# Patient Record
Sex: FEMALE | Race: WHITE | NOT HISPANIC OR LATINO | Employment: OTHER | URBAN - METROPOLITAN AREA
[De-identification: names, ages, dates, MRNs, and addresses within clinical notes are randomized per-mention and may not be internally consistent; named-entity substitution may affect disease eponyms.]

---

## 2017-02-21 ENCOUNTER — APPOINTMENT (OUTPATIENT)
Dept: GENERAL RADIOLOGY | Facility: HOSPITAL | Age: 72
End: 2017-02-21

## 2017-02-21 PROCEDURE — 73070 X-RAY EXAM OF ELBOW: CPT | Performed by: FAMILY MEDICINE

## 2017-06-25 ENCOUNTER — TELEPHONE (OUTPATIENT)
Dept: URGENT CARE | Facility: CLINIC | Age: 72
End: 2017-06-25

## 2018-06-15 ENCOUNTER — PREP FOR SURGERY (OUTPATIENT)
Dept: OTHER | Facility: HOSPITAL | Age: 73
End: 2018-06-15

## 2018-06-15 DIAGNOSIS — Z86.010 HISTORY OF ADENOMATOUS POLYP OF COLON: Primary | ICD-10-CM

## 2018-07-02 PROBLEM — Z86.010 HISTORY OF ADENOMATOUS POLYP OF COLON: Status: ACTIVE | Noted: 2018-07-02

## 2018-07-09 ENCOUNTER — TELEPHONE (OUTPATIENT)
Dept: GASTROENTEROLOGY | Facility: CLINIC | Age: 73
End: 2018-07-09

## 2018-07-09 NOTE — TELEPHONE ENCOUNTER
"Call to pt.  Reports 1 1/2 weeks ago had hard stool causing internal hemorrhoids to bleed.  Began sitz baths and fell in tub - will see ortho today.  States has limited diet to toast, applesauce, chicken broth because after eating, experiences \"hyperperistalsis\", gas, cramping, nausea.   Continues to have hard stools.  States stool softeners not helpful.      Pt tearful - asking how to manage symptoms until c/s scheduled for 8/22.  Question to Dr Becerra.    Also requesting change bowel prep to Miralax prep - home address verified and info mailed.    "

## 2018-07-09 NOTE — TELEPHONE ENCOUNTER
----- Message from Inez Botello sent at 7/9/2018  8:29 AM EDT -----  Regarding: NOT FEELING WELL   Contact: [phone]  PT CALLED COMPLAIN OF CHANGES IN BOWELS AND NOT ABLE TO EAT..

## 2018-07-10 RX ORDER — LUBIPROSTONE 24 UG/1
24 CAPSULE ORAL 2 TIMES DAILY WITH MEALS
Qty: 60 CAPSULE | Refills: 3 | Status: SHIPPED | OUTPATIENT
Start: 2018-07-10 | End: 2018-07-20 | Stop reason: DRUGHIGH

## 2018-07-10 NOTE — TELEPHONE ENCOUNTER
I have sent Adriana to her pharmacy for her to try.  Take once a day with food and increase to twice daily if needed.  Recommend a prep other than the MiraLAX prep due to her history of chronic constipation if she feels like she can tolerate it.

## 2018-07-13 ENCOUNTER — TELEPHONE (OUTPATIENT)
Dept: GASTROENTEROLOGY | Facility: CLINIC | Age: 73
End: 2018-07-13

## 2018-07-13 NOTE — TELEPHONE ENCOUNTER
Call to pt.  hospitals has been taking Amitiza once daily with good results - asking if ok to continue.  Adise per Dr Becerra instructions of 7/10 to take once daily with good and increase to twice daily IF NEEDED.  Pt verb understanding.    hospitals has changed eating habits - cut out all junk food.  Eating fresh fruit, whole grain bread.    hospitals does not want to take Suprep because concerned about kidney function. hospitals plans to take Miralax prep, because now having good results with Amitiza.  Udpate to Dr Becerra.

## 2018-07-13 NOTE — TELEPHONE ENCOUNTER
----- Message from Yahaira Hall sent at 7/13/2018 10:30 AM EDT -----  Regarding: PT CALLED ABOUT MEDICATION  (AMITIZA)  Contact: [phone]  ..

## 2018-07-20 ENCOUNTER — TELEPHONE (OUTPATIENT)
Dept: GASTROENTEROLOGY | Facility: CLINIC | Age: 73
End: 2018-07-20

## 2018-07-20 RX ORDER — LUBIPROSTONE 8 UG/1
8 CAPSULE ORAL 2 TIMES DAILY WITH MEALS
Qty: 60 CAPSULE | Refills: 5 | Status: SHIPPED | OUTPATIENT
Start: 2018-07-20 | End: 2022-10-04

## 2018-07-20 NOTE — TELEPHONE ENCOUNTER
Called pt and pt reports that she takes her amitiza 24mcg around 11pm.  She states she wakes up at 5am and has at least 4 watery stools per day.  Pt is asking if she can have the dose lowered to 8mcg.  Pt states that way if she needs to take 2 she can.  Advised pt would send message to Dr Becerra. Pt verb understanding.

## 2018-07-20 NOTE — TELEPHONE ENCOUNTER
Called pt and advised per Dr Becerra that she has called in Amitiza 8mcg one tab bid to her [facility]. Pt verb understanding.

## 2018-07-20 NOTE — TELEPHONE ENCOUNTER
----- Message from Winnie Pittman sent at 7/20/2018  9:04 AM EDT -----  Regarding: PT CALLED - AMITIZA CAUSING DIARRHEA  Contact: [phone]  PT ASK IF DOSAGE COULD BE LOWERED. SHE ASK FOR 8MCG ON THE AMITIZA IF POSSIBLE.

## 2018-08-21 ENCOUNTER — TELEPHONE (OUTPATIENT)
Dept: GASTROENTEROLOGY | Facility: CLINIC | Age: 73
End: 2018-08-21

## 2018-08-21 NOTE — TELEPHONE ENCOUNTER
Call to pt.  States has questions about med record on "[employer]t.  Advise to reconcile meds with each o/v.  Verb understanding.

## 2018-08-21 NOTE — TELEPHONE ENCOUNTER
----- Message from Tiffanie Green sent at 8/21/2018  8:59 AM EDT -----  Regarding: [facility]  Contact: [phone]  Please call pt regarding medications that are listed in her MyChart. The pt said she called the MyChart people and they said that one of the nurses from our office would have to fix it. Thx

## 2018-08-22 ENCOUNTER — HOSPITAL ENCOUNTER (OUTPATIENT)
Facility: HOSPITAL | Age: 73
Setting detail: HOSPITAL OUTPATIENT SURGERY
Discharge: HOME | End: 2018-08-22
Attending: INTERNAL MEDICINE | Admitting: INTERNAL MEDICINE

## 2018-08-22 ENCOUNTER — ANESTHESIA EVENT (OUTPATIENT)
Dept: GASTROENTEROLOGY | Facility: HOSPITAL | Age: 73
End: 2018-08-22

## 2018-08-22 ENCOUNTER — ANESTHESIA (OUTPATIENT)
Dept: GASTROENTEROLOGY | Facility: HOSPITAL | Age: 73
End: 2018-08-22

## 2018-08-22 VITALS
DIASTOLIC BLOOD PRESSURE: 74 MMHG | OXYGEN SATURATION: 100 % | RESPIRATION RATE: 18 BRPM | WEIGHT: 167.25 LBS | SYSTOLIC BLOOD PRESSURE: 112 MMHG | HEIGHT: 67 IN | BODY MASS INDEX: 26.25 KG/M2 | TEMPERATURE: 98 F | HEART RATE: 55 BPM

## 2018-08-22 DIAGNOSIS — Z86.010 HISTORY OF ADENOMATOUS POLYP OF COLON: ICD-10-CM

## 2018-08-22 PROCEDURE — S0260 H&P FOR SURGERY: HCPCS | Performed by: INTERNAL MEDICINE

## 2018-08-22 PROCEDURE — 45381 COLONOSCOPY SUBMUCOUS NJX: CPT | Performed by: INTERNAL MEDICINE

## 2018-08-22 PROCEDURE — 25010000002 PROPOFOL 10 MG/ML EMULSION: Performed by: ANESTHESIOLOGY

## 2018-08-22 PROCEDURE — 45385 COLONOSCOPY W/LESION REMOVAL: CPT | Performed by: INTERNAL MEDICINE

## 2018-08-22 PROCEDURE — 88305 TISSUE EXAM BY PATHOLOGIST: CPT | Performed by: INTERNAL MEDICINE

## 2018-08-22 DEVICE — DEV CLIP ENDO RESOLUTION360 CONTRL ROT 235CM: Type: IMPLANTABLE DEVICE | Site: TRANSVERSE COLON | Status: FUNCTIONAL

## 2018-08-22 RX ORDER — LOSARTAN POTASSIUM 50 MG/1
50 TABLET ORAL DAILY
COMMUNITY
End: 2022-10-04

## 2018-08-22 RX ORDER — PROPOFOL 10 MG/ML
VIAL (ML) INTRAVENOUS CONTINUOUS PRN
Status: DISCONTINUED | OUTPATIENT
Start: 2018-08-22 | End: 2018-08-22 | Stop reason: SURG

## 2018-08-22 RX ORDER — PROPOFOL 10 MG/ML
VIAL (ML) INTRAVENOUS AS NEEDED
Status: DISCONTINUED | OUTPATIENT
Start: 2018-08-22 | End: 2018-08-22 | Stop reason: SURG

## 2018-08-22 RX ORDER — SODIUM CHLORIDE, SODIUM LACTATE, POTASSIUM CHLORIDE, CALCIUM CHLORIDE 600; 310; 30; 20 MG/100ML; MG/100ML; MG/100ML; MG/100ML
30 INJECTION, SOLUTION INTRAVENOUS CONTINUOUS PRN
Status: DISCONTINUED | OUTPATIENT
Start: 2018-08-22 | End: 2018-08-22 | Stop reason: HOSPADM

## 2018-08-22 RX ORDER — LIDOCAINE HYDROCHLORIDE 20 MG/ML
INJECTION, SOLUTION INFILTRATION; PERINEURAL AS NEEDED
Status: DISCONTINUED | OUTPATIENT
Start: 2018-08-22 | End: 2018-08-22 | Stop reason: SURG

## 2018-08-22 RX ADMIN — PROPOFOL 50 MG: 10 INJECTION, EMULSION INTRAVENOUS at 11:00

## 2018-08-22 RX ADMIN — SODIUM CHLORIDE, POTASSIUM CHLORIDE, SODIUM LACTATE AND CALCIUM CHLORIDE: 600; 310; 30; 20 INJECTION, SOLUTION INTRAVENOUS at 10:57

## 2018-08-22 RX ADMIN — SODIUM CHLORIDE, POTASSIUM CHLORIDE, SODIUM LACTATE AND CALCIUM CHLORIDE 30 ML/HR: 600; 310; 30; 20 INJECTION, SOLUTION INTRAVENOUS at 10:48

## 2018-08-22 RX ADMIN — PROPOFOL 140 MCG/KG/MIN: 10 INJECTION, EMULSION INTRAVENOUS at 11:03

## 2018-08-22 RX ADMIN — LIDOCAINE HYDROCHLORIDE 30 MG: 20 INJECTION, SOLUTION INFILTRATION; PERINEURAL at 10:59

## 2018-08-22 NOTE — ANESTHESIA POSTPROCEDURE EVALUATION
"Patient: Debo Blair    Procedure Summary     Date:  08/22/18 Room / Location:   [facility]    Anesthesia Start:  1057 Anesthesia Stop:  1149    Procedure:  COLONOSCOPY TO CECUM AND INTO TERMINAL ILEUM WITH HOT SNARE POLYPECTOMY,  5 ML SALINE LIFT INJECTION, 1 ML TATTOO INK INJECTION, RESOLUTION CLIP X1 PLACEMENT, AND APC CAUTERY TO TRANSVERSE COLON POLYP SITE (N/A ) Diagnosis:       History of adenomatous polyp of colon      (History of adenomatous polyp of colon [Z86.010])    Surgeon:  Luna Becerra MD Provider:  Victor Manuel Can MD    Anesthesia Type:  MAC ASA Status:  3          Anesthesia Type: MAC  Last vitals  BP   (!) 88/58 (08/22/18 1148)   Temp   36.7 °C (98 °F) (08/22/18 1148)   Pulse   65 (08/22/18 1148)   Resp   18 (08/22/18 1148)     SpO2   99 % (08/22/18 1148)     Post Anesthesia Care and Evaluation    Patient location during evaluation: bedside  Patient participation: complete - patient participated  Level of consciousness: awake  Pain score: 2  Pain management: adequate  Airway patency: patent  Anesthetic complications: No anesthetic complications  PONV Status: none  Cardiovascular status: acceptable  Respiratory status: acceptable  Hydration status: acceptable    Comments: BP (!) 88/58 (BP Location: Left arm, Patient Position: Lying)   Pulse 65   Temp 36.7 °C (98 °F) (Oral)   Resp 18   Ht 170.2 cm (67\")   Wt 75.9 kg (167 lb 4 oz)   LMP  (LMP Unknown)   SpO2 99%   BMI 26.20 kg/m²         "

## 2018-08-22 NOTE — ANESTHESIA PREPROCEDURE EVALUATION
Anesthesia Evaluation     Patient summary reviewed and Nursing notes reviewed   NPO Solid Status: > 8 hours  NPO Liquid Status: > 8 hours           Airway   Mallampati: II  TM distance: >3 FB  Neck ROM: full  Dental - normal exam     Pulmonary - negative pulmonary ROS and normal exam   Cardiovascular - normal exam    (+) hypertension,       Neuro/Psych  (+) psychiatric history Anxiety,     GI/Hepatic/Renal/Endo    (+)  GERD,      Musculoskeletal     Abdominal    Substance History      OB/GYN          Other   (+) arthritis   history of cancer                    Anesthesia Plan    ASA 3     MAC     Anesthetic plan and risks discussed with patient.

## 2018-08-22 NOTE — DISCHARGE INSTRUCTIONS
For the next 24 hours patient needs to be with a responsible adult.    For 24 hours DO NOT drive, operate machinery, appliances, drink alcohol, make important decisions or sign legal documents.    Start with a light or bland diet and advance to regular diet as tolerated.    Follow recommendations on procedure report provided by your doctor.    Call Dr Becerra for problems [phone]    Problems may include but not limited to: large amounts of bleeding, trouble breathing, repeated vomiting, severe unrelieved pain, fever or chills.

## 2018-08-22 NOTE — H&P
[facility]  Pre Procedure History & Physical    Chief Complaint:   History of adenomatous polyps, history of endoscopically unresectable polyp    Subjective     HPI:   73 yo wf for colonoscopy due to History of adenomatous polyps, history of endoscopically unresectable polyp.  She underwent c/s last in 2016 - descending colon polyp could not be removed endoscopically and she subsequently underwent colon resection in 2016 - path TA with LGD.    Past Medical History:   Past Medical History:   Diagnosis Date   • Anxiety    • Arthritis    • Diverticulosis    • Fibrocystic breast changes 1971   • GERD (gastroesophageal reflux disease)    • History of colon polyps    • Hypertension    • IBS (irritable bowel syndrome)    • Melanosis coli    • Osteoarthritis    • Osteoporosis    • Paralytic ileus (CMS/HCC) 1975       Past Surgical History:  Past Surgical History:   Procedure Laterality Date   • ANAL FISSURECTOMY  2004   • CHOLECYSTECTOMY     • COLON RESECTION N/A 3/21/2016    Procedure: COLON RESECTION LAPAROSCOPIC LEFT MINDY COLECTOMY ;  Surgeon: Nikki Smith MD;  Location: [facility];  Service:    • COLON RESECTION     • COLONOSCOPY  01/18/2016    polyps, NBIH, tubular adenoma w/low grade dysplasia   • HYSTERECTOMY     • SKIN TAG REMOVAL  2004       Family History:  Family History   Problem Relation Age of Onset   • Diabetes Mother    • Hypertension Mother    • Kidney disease Mother    • Heart disease Father    • Arthritis Father    • Coarctation of the aorta Father    • Heart valve disorder Brother    • Colon polyps Daughter    • Seizures Son        Social History:   reports that she quit smoking about 3 years ago. She started smoking about 48 years ago. She has a 40.00 pack-year smoking history. She quit smokeless tobacco use about 2 years ago. She reports that she does not drink alcohol or use drugs.    Medications:   Prescriptions Prior to Admission   Medication Sig Dispense Refill Last  Dose   • calcium carbonate (TUMS) 500 MG chewable tablet Chew 1 tablet Daily.   Past Month at Unknown time   • losartan (COZAAR) 50 MG tablet Take 50 mg by mouth Daily.   8/22/2018 at Unknown time   • metoprolol tartrate (LOPRESSOR) 25 MG tablet Take 25 mg by mouth 2 (Two) Times a Day.   8/21/2018 at Unknown time   • PROAIR  (90 BASE) MCG/ACT inhaler 2 puffs every 4 (four) hours as needed.  1 8/21/2018 at Unknown time   • Probiotic Product (PROBIOTIC DAILY PO) Take  by mouth.   Past Week at Unknown time   • simethicone (MYLICON) 125 MG chewable tablet Chew 125 mg Every 6 (Six) Hours As Needed for flatulence.   Past Month at Unknown time   • ALPRAZolam (XANAX) 1 MG tablet Take 0.05 mg by mouth at night as needed.  4 8/20/2018   • aspirin 81 MG EC tablet Take 81 mg by mouth Daily.   More than a month at Unknown time   • Codeine Polt-Chlorphen Polt ER (TUZISTRA XR) 14.7-2.8 MG/5ML Suspension Extended Release Take 10 mL by mouth 2 (Two) Times a Day. 180 mL 0    • coenzyme Q10 100 MG capsule Take 100 mg by mouth Daily.   Taking   • KRILL OIL PO Take  by mouth.   Taking   • lubiprostone (AMITIZA) 8 MCG capsule Take 1 capsule by mouth 2 (Two) Times a Day With Meals. 60 capsule 5 8/18/2018   • montelukast (SINGULAIR) 10 MG tablet   3 8/20/2018   • polyethylene glycol (MIRALAX) packet Take 17 g by mouth Daily.   Taking   • pravastatin (PRAVACHOL) 10 MG tablet Take 5 mg by mouth Daily.   Taking   • promethazine (PHENERGAN) 12.5 MG tablet Take 1 tablet by mouth every 6 (six) hours as needed for nausea or vomiting. 46 tablet 0 More than a month at Unknown time   • SUMAtriptan (IMITREX) 100 MG tablet Take one tablet at onset of headache. May repeat dose one time in 2 hours if headache not relieved.  4 More than a month at Unknown time   • valsartan (DIOVAN) 80 MG tablet Take 160 mg by mouth every morning. LIKES TO TAKE TWO 80 MG TABS  3 Taking   • vitamin D (ERGOCALCIFEROL) 97276 UNITS capsule capsule 50,000 Units every 7  "days.  3 8/18/2018       Allergies:  Latex; Neosporin [neomycin-bacitracin zn-polymyx]; Sulfa antibiotics; Meclizine; and Codeine    ROS:    Pertinent items are noted in HPI, all other systems reviewed and negative     Objective     Blood pressure 115/70, pulse 57, temperature 98.1 °F (36.7 °C), temperature source Oral, resp. rate 16, height 170.2 cm (67\"), weight 75.9 kg (167 lb 4 oz), SpO2 96 %, not currently breastfeeding.    Physical Exam   Constitutional: Pt is oriented to person, place, and time and well-developed, well-nourished, and in no distress.   Mouth/Throat: Oropharynx is clear and moist.   Neck: Normal range of motion.   Cardiovascular: Normal rate, regular rhythm     Pulmonary/Chest: Effort normal   Abdominal: Soft. Nontender  Skin: Skin is warm and dry.   Psychiatric: Mood, memory, affect and judgment normal.     Assessment/Plan     Diagnosis:  History of adenomatous polyps, history of endoscopically unresectable polyp    Anticipated Surgical Procedure:  colonoscopy    The risks, benefits, and alternatives of this procedure have been discussed with the patient or the responsible party- the patient understands and agrees to proceed.                                                            "

## 2018-08-23 ENCOUNTER — TELEPHONE (OUTPATIENT)
Dept: GASTROENTEROLOGY | Facility: CLINIC | Age: 73
End: 2018-08-23

## 2018-08-23 LAB
CYTO UR: NORMAL
LAB AP CASE REPORT: NORMAL
PATH REPORT.FINAL DX SPEC: NORMAL
PATH REPORT.GROSS SPEC: NORMAL

## 2018-08-23 NOTE — TELEPHONE ENCOUNTER
Called pt and pt reports she had her c/s yesterday .  She states she was feeling well today and  reports that she lifted a trash can with waste in it today  and developed pain in the LUQ.  The pain was a 7 but after she took a simethecone the pain is now around a 5.  Pt denies a fever, chills, and nausea.  Pt is asking if this is ok.  Advised pt would send message to Dr Becerra and in the meantime if symptoms worsen seek medical attn. Pt verb understanding.

## 2018-08-23 NOTE — ADDENDUM NOTE
Addendum  created 08/22/18 2020 by Arnoldo Prado MD    Anesthesia Review and Sign - Ready for Procedure, Anesthesia Review and Sign - Signed

## 2018-08-23 NOTE — TELEPHONE ENCOUNTER
----- Message from Inez Botello sent at 8/23/2018  1:02 PM EDT -----  Regarding: SIDE PAIN   Contact: [phone]  PT CALLED STATED HAD A SCOPE YESTERDAY AND MOVE SOME OBJECT OUTSIDE. PT IS HAVING SIDE PAIN

## 2018-08-24 NOTE — TELEPHONE ENCOUNTER
Called pt and advised per Dr Becerra that as long as she continuing to improve , no further workup needed especially since the pain did not start until the day following her procedure.  .       Pt verb understanding and reports she is feeling much better today.  She denies any fever and states she has an appt with her pcp today.

## 2018-08-24 NOTE — TELEPHONE ENCOUNTER
As long as she is continuing to improve, no further workup needed especially since the pain did not start until the day following her procedure.  Pls make sure she is feeling better today and has not had a fever

## 2018-08-27 ENCOUNTER — TELEPHONE (OUTPATIENT)
Dept: GASTROENTEROLOGY | Facility: CLINIC | Age: 73
End: 2018-08-27

## 2018-08-27 NOTE — TELEPHONE ENCOUNTER
The polyp(s) biopsies showed adenomatous change. This is not cancerous but is considered potentially precancerous. Follow-up colonoscopy in 2 years is advised.

## 2018-08-28 NOTE — TELEPHONE ENCOUNTER
Call to pt.  Advise per Dr Becerra that polyp(s) bx showed adenomatous change. This is not cancerous, but is considered potentially precancerous.  F/u c/s in 2 yrs is advised.  Pt verb understanding.    C/s for 8/22/20 placed in recall.

## 2018-10-24 ENCOUNTER — TELEPHONE (OUTPATIENT)
Dept: GASTROENTEROLOGY | Facility: CLINIC | Age: 73
End: 2018-10-24

## 2018-10-24 NOTE — TELEPHONE ENCOUNTER
----- Message from Inez Botello sent at 10/24/2018  8:29 AM EDT -----  Regarding: med  Contact: [phone]  Pt called with questions about amitiza

## 2018-10-24 NOTE — TELEPHONE ENCOUNTER
Called pt and pt reports she has been taking amitiza daily(once day).  She states things has been going ok.  Pt reports she has developed diarrhea since she began taking macrobid and the amitiza. .  Pt reports that she did begin taking a probiotic yesterday.  Pt is taking macrobid for uti.  Pt is asking if she can hold her amitiza till she finished the macrobid ( 1 wk).   Advised pt to continue the probiotic and macrobid.  Also advised pt to hold amitiza while having diarrhea.  Once stool is formed can resume again.  Advised pt would send message to Dr Becerra and if any further instructions or change instructions will call her back. Pt verb understanding.

## 2019-03-27 ENCOUNTER — APPOINTMENT (OUTPATIENT)
Dept: GENERAL RADIOLOGY | Facility: HOSPITAL | Age: 74
End: 2019-03-27

## 2019-03-27 ENCOUNTER — HOSPITAL ENCOUNTER (EMERGENCY)
Facility: HOSPITAL | Age: 74
Discharge: HOME | End: 2019-03-27
Attending: EMERGENCY MEDICINE | Admitting: EMERGENCY MEDICINE

## 2019-03-27 VITALS
TEMPERATURE: 97.3 F | SYSTOLIC BLOOD PRESSURE: 150 MMHG | DIASTOLIC BLOOD PRESSURE: 94 MMHG | BODY MASS INDEX: 25.9 KG/M2 | HEIGHT: 67 IN | OXYGEN SATURATION: 97 % | WEIGHT: 165 LBS | RESPIRATION RATE: 18 BRPM | HEART RATE: 116 BPM

## 2019-03-27 DIAGNOSIS — S32.010S CLOSED COMPRESSION FRACTURE OF FIRST LUMBAR VERTEBRA, SEQUELA: Primary | ICD-10-CM

## 2019-03-27 DIAGNOSIS — S60.221A CONTUSION OF RIGHT HAND, INITIAL ENCOUNTER: ICD-10-CM

## 2019-03-27 DIAGNOSIS — S76.011A HIP STRAIN, RIGHT, INITIAL ENCOUNTER: ICD-10-CM

## 2019-03-27 DIAGNOSIS — S60.222A CONTUSION OF LEFT HAND, INITIAL ENCOUNTER: ICD-10-CM

## 2019-03-27 DIAGNOSIS — S46.912A STRAIN OF LEFT ELBOW, INITIAL ENCOUNTER: ICD-10-CM

## 2019-03-27 DIAGNOSIS — S80.11XA CONTUSION OF RIGHT LOWER LEG, INITIAL ENCOUNTER: ICD-10-CM

## 2019-03-27 PROCEDURE — 73070 X-RAY EXAM OF ELBOW: CPT

## 2019-03-27 PROCEDURE — 72110 X-RAY EXAM L-2 SPINE 4/>VWS: CPT

## 2019-03-27 PROCEDURE — 73502 X-RAY EXAM HIP UNI 2-3 VIEWS: CPT

## 2019-03-27 PROCEDURE — 99282 EMERGENCY DEPT VISIT SF MDM: CPT

## 2019-03-27 PROCEDURE — 73590 X-RAY EXAM OF LOWER LEG: CPT

## 2019-03-27 PROCEDURE — 73630 X-RAY EXAM OF FOOT: CPT

## 2019-03-27 PROCEDURE — 73130 X-RAY EXAM OF HAND: CPT

## 2021-03-03 DIAGNOSIS — Z23 IMMUNIZATION DUE: ICD-10-CM

## 2021-03-08 ENCOUNTER — TELEPHONE (OUTPATIENT)
Dept: GASTROENTEROLOGY | Facility: CLINIC | Age: 76
End: 2021-03-08

## 2021-03-08 NOTE — TELEPHONE ENCOUNTER
LAST COLONOSCOPY 8/22/18, REPORT IS IN Epic    PERSONAL HX OF COLON POLYPS     FAMILY HX OF COLON POLYPS / CA (GRANDMOTHER)    PT IS CURRENTLY TAKING BABY ASPRIN    RX:   ALPRAZolam (XANAX) 1 MG tablet  aspirin 81 MG EC tablet    Codeine Polt-Chlorphen Polt ER (TUZISTRA XR) 14.7-2.8 MG/5ML Suspension Extended Release  coenzyme Q10 100 MG capsule  ADENOVA X 2 ADAY  AZAFITE X 1 A MONTH   losartan (COZAAR) 50 MG tablet  montelukast (SINGULAIR) 10 MG tablet  polyethylene glycol (MIRALAX) packet  Probiotic Product (PROBIOTIC DAILY PO)  simethicone (MYLICON) 125 MG chewable tablet  SUMAtriptan (IMITREX) 100 MG tablet  valsartan (DIOVAN) 80 MG tablet  vitamin D (ERGOCALCIFEROL) 57660 UNITS capsule capsule       OA FORM HAS BEEN SCANNED INTO Epic

## 2021-03-08 NOTE — TELEPHONE ENCOUNTER
----- Message from Brenna Tolbert Rep sent at 3/5/2021  9:38 AM EST -----  Regarding: Open Access  Contact: [phone]  Pt is wanting to have procedure

## 2021-03-19 ENCOUNTER — PREP FOR SURGERY (OUTPATIENT)
Dept: OTHER | Facility: HOSPITAL | Age: 76
End: 2021-03-19

## 2021-03-19 DIAGNOSIS — D12.6 ADENOMATOUS POLYP OF COLON, UNSPECIFIED PART OF COLON: Primary | ICD-10-CM

## 2021-03-31 PROBLEM — D12.6 ADENOMATOUS POLYP OF COLON: Status: ACTIVE | Noted: 2021-03-31

## 2021-05-14 ENCOUNTER — TELEPHONE (OUTPATIENT)
Dept: GASTROENTEROLOGY | Facility: CLINIC | Age: 76
End: 2021-05-14

## 2021-05-14 NOTE — TELEPHONE ENCOUNTER
Called pt and pt reports that yesterday she was diagnosed with cellulitis in 2 toes.  Her MD is going to order Keflex but it has not been sent to her pharmacy yet.  Pt is asking is it still ok to have c/s on 06/02 or does she need to postpone it.  Advised will send message to Dr Becerra.     Pt advised if she does not answer phone, it is ok to leave info on vm.

## 2021-05-14 NOTE — TELEPHONE ENCOUNTER
----- Message from Brenna Amezcua sent at 5/13/2021  4:11 PM EDT -----  Regarding: colonoscopy question  Contact: [phone]  Pt has 2 infected toes with cellulitis. Please call pt to advise if it would be okay to continue with scheduled colonoscopy 6/2/21 or should it be reschedule.

## 2021-05-27 ENCOUNTER — TELEPHONE (OUTPATIENT)
Dept: GASTROENTEROLOGY | Facility: CLINIC | Age: 76
End: 2021-05-27

## 2021-05-27 NOTE — TELEPHONE ENCOUNTER
----- Message from rBenna Tolbert Rep sent at 5/27/2021  2:09 PM EDT -----  Regarding: Question  Contact: [phone]  Pt has some questions she would like to ask

## 2021-05-27 NOTE — TELEPHONE ENCOUNTER
Call to pt.  States saw foot doctor today and was cleared for c/s on 6/2.     Update to DR Becerra.

## 2021-06-02 ENCOUNTER — ANESTHESIA EVENT (OUTPATIENT)
Dept: GASTROENTEROLOGY | Facility: HOSPITAL | Age: 76
End: 2021-06-02

## 2021-06-02 ENCOUNTER — HOSPITAL ENCOUNTER (OUTPATIENT)
Facility: HOSPITAL | Age: 76
Setting detail: HOSPITAL OUTPATIENT SURGERY
Discharge: HOME | End: 2021-06-02
Attending: INTERNAL MEDICINE | Admitting: INTERNAL MEDICINE

## 2021-06-02 ENCOUNTER — ANESTHESIA (OUTPATIENT)
Dept: GASTROENTEROLOGY | Facility: HOSPITAL | Age: 76
End: 2021-06-02

## 2021-06-02 VITALS
HEART RATE: 80 BPM | BODY MASS INDEX: 26.68 KG/M2 | HEIGHT: 67 IN | RESPIRATION RATE: 16 BRPM | SYSTOLIC BLOOD PRESSURE: 126 MMHG | DIASTOLIC BLOOD PRESSURE: 76 MMHG | OXYGEN SATURATION: 95 % | WEIGHT: 170 LBS

## 2021-06-02 DIAGNOSIS — D12.6 ADENOMATOUS POLYP OF COLON, UNSPECIFIED PART OF COLON: ICD-10-CM

## 2021-06-02 PROCEDURE — 45385 COLONOSCOPY W/LESION REMOVAL: CPT | Performed by: INTERNAL MEDICINE

## 2021-06-02 PROCEDURE — S0260 H&P FOR SURGERY: HCPCS | Performed by: INTERNAL MEDICINE

## 2021-06-02 PROCEDURE — 45380 COLONOSCOPY AND BIOPSY: CPT | Performed by: INTERNAL MEDICINE

## 2021-06-02 PROCEDURE — 88305 TISSUE EXAM BY PATHOLOGIST: CPT | Performed by: INTERNAL MEDICINE

## 2021-06-02 PROCEDURE — 25010000002 PROPOFOL 10 MG/ML EMULSION: Performed by: ANESTHESIOLOGY

## 2021-06-02 RX ORDER — PROPOFOL 10 MG/ML
VIAL (ML) INTRAVENOUS AS NEEDED
Status: DISCONTINUED | OUTPATIENT
Start: 2021-06-02 | End: 2021-06-02 | Stop reason: SURG

## 2021-06-02 RX ORDER — SODIUM CHLORIDE, SODIUM LACTATE, POTASSIUM CHLORIDE, CALCIUM CHLORIDE 600; 310; 30; 20 MG/100ML; MG/100ML; MG/100ML; MG/100ML
30 INJECTION, SOLUTION INTRAVENOUS CONTINUOUS PRN
Status: DISCONTINUED | OUTPATIENT
Start: 2021-06-02 | End: 2021-06-02 | Stop reason: HOSPADM

## 2021-06-02 RX ORDER — LIDOCAINE HYDROCHLORIDE 20 MG/ML
INJECTION, SOLUTION INFILTRATION; PERINEURAL AS NEEDED
Status: DISCONTINUED | OUTPATIENT
Start: 2021-06-02 | End: 2021-06-02 | Stop reason: SURG

## 2021-06-02 RX ORDER — GLYCOPYRROLATE 0.2 MG/ML
INJECTION INTRAMUSCULAR; INTRAVENOUS AS NEEDED
Status: DISCONTINUED | OUTPATIENT
Start: 2021-06-02 | End: 2021-06-02 | Stop reason: SURG

## 2021-06-02 RX ADMIN — LIDOCAINE HYDROCHLORIDE 40 MG: 20 INJECTION, SOLUTION INFILTRATION; PERINEURAL at 10:07

## 2021-06-02 RX ADMIN — SODIUM CHLORIDE, POTASSIUM CHLORIDE, SODIUM LACTATE AND CALCIUM CHLORIDE 30 ML/HR: 600; 310; 30; 20 INJECTION, SOLUTION INTRAVENOUS at 09:57

## 2021-06-02 RX ADMIN — GLYCOPYRROLATE 0.2 MG: 0.2 INJECTION INTRAMUSCULAR; INTRAVENOUS at 10:07

## 2021-06-02 RX ADMIN — PROPOFOL 150 MG: 10 INJECTION, EMULSION INTRAVENOUS at 10:07

## 2021-06-02 RX ADMIN — PROPOFOL 160 MCG/KG/MIN: 10 INJECTION, EMULSION INTRAVENOUS at 10:07

## 2021-06-02 NOTE — DISCHARGE INSTRUCTIONS

## 2021-06-02 NOTE — ANESTHESIA PREPROCEDURE EVALUATION
Anesthesia Evaluation     Patient summary reviewed and Nursing notes reviewed   no history of anesthetic complications:  NPO Solid Status: > 8 hours  NPO Liquid Status: > 2 hours           Airway   Mallampati: II  TM distance: >3 FB  Neck ROM: full  no difficulty expected  Dental - normal exam     Pulmonary - normal exam   (+) a smoker Current Smoked day of surgery,   (-) COPD, asthma, lung cancer  Cardiovascular - normal exam  Exercise tolerance: good (4-7 METS)    Patient on routine beta blocker and Beta blocker given within 24 hours of surgery  Rhythm: regular  Rate: normal    (+) hypertension well controlled 2 medications or greater,   (-) valvular problems/murmurs, past MI, CAD, dysrhythmias, angina, CHF, cardiac stents, CABG, pericardial effusion      Neuro/Psych  (+) headaches, psychiatric history Anxiety,     (-) seizures, TIA, CVA  GI/Hepatic/Renal/Endo    (+)  GERD well controlled,    (-) hiatal hernia, PUD, hepatitis, liver disease, no renal disease, diabetes, GI bleed, no thyroid disorder    Musculoskeletal     Abdominal  - normal exam   Substance History - negative use     OB/GYN negative ob/gyn ROS         Other   arthritis,          Phys Exam Other: Caps and veneers               Anesthesia Plan    ASA 2     MAC     intravenous induction     Anesthetic plan, all risks, benefits, and alternatives have been provided, discussed and informed consent has been obtained with: patient.

## 2021-06-02 NOTE — H&P
[facility]  Pre Procedure History & Physical    Chief Complaint:   History of adenomatous polyps    Subjective     HPI:   74yo here today for colonoscopy.  Pt reports family history of polyps in her daughter.   Last exam 2018 with piecemeal resection of a large sessile adenoma in the transverse colon with APC of residual tissue.  Lesion was tattooed.  Path reflected tubular adenoma with low-grade dysplasia.    Past Medical History:   Past Medical History:   Diagnosis Date   • Anxiety    • Arthritis    • Diverticulosis    • Fibrocystic breast changes 1971   • GERD (gastroesophageal reflux disease)    • History of colon polyps    • Hypertension    • IBS (irritable bowel syndrome)    • Melanosis coli    • Osteoarthritis    • Osteoporosis    • Paralytic ileus (CMS/HCC) 1975       Past Surgical History:  Past Surgical History:   Procedure Laterality Date   • ANAL FISSURECTOMY  2004   • CHOLECYSTECTOMY     • COLON RESECTION N/A 3/21/2016    Procedure: COLON RESECTION LAPAROSCOPIC LEFT MINDY COLECTOMY ;  Surgeon: Nikki Smith MD;  Location: [facility];  Service:    • COLON RESECTION     • COLONOSCOPY  01/18/2016    polyps, NBIH, tubular adenoma w/low grade dysplasia   • COLONOSCOPY N/A 8/22/2018    Procedure: COLONOSCOPY TO CECUM AND INTO TERMINAL ILEUM WITH HOT SNARE POLYPECTOMY,  5 ML SALINE LIFT INJECTION, 1 ML TATTOO INK INJECTION, RESOLUTION CLIP X1 PLACEMENT, AND APC CAUTERY TO TRANSVERSE COLON POLYP SITE;  Surgeon: Luna Becerra MD;  Location: [facility] ENDOSCOPY;  Service: Gastroenterology   • HYSTERECTOMY     • SKIN TAG REMOVAL  2004       Family History:  Family History   Problem Relation Age of Onset   • Diabetes Mother    • Hypertension Mother    • Kidney disease Mother    • Heart disease Father    • Arthritis Father    • Coarctation of the aorta Father    • Heart valve disorder Brother    • Colon polyps Daughter    • Seizures Son        Social History:   reports that she quit  smoking about 6 years ago. She started smoking about 51 years ago. She has a 40.00 pack-year smoking history. She quit smokeless tobacco use about 5 years ago. She reports that she does not drink alcohol and does not use drugs.    Medications:   Medications Prior to Admission   Medication Sig Dispense Refill Last Dose   • ALPRAZolam (XANAX) 1 MG tablet Take 0.05 mg by mouth at night as needed.  4    • aspirin 81 MG EC tablet Take 81 mg by mouth Daily.      • calcium carbonate (TUMS) 500 MG chewable tablet Chew 1 tablet Daily.      • Codeine Polt-Chlorphen Polt ER (TUZISTRA XR) 14.7-2.8 MG/5ML Suspension Extended Release Take 10 mL by mouth 2 (Two) Times a Day. 180 mL 0    • coenzyme Q10 100 MG capsule Take 100 mg by mouth Daily.      • KRILL OIL PO Take  by mouth.      • losartan (COZAAR) 50 MG tablet Take 50 mg by mouth Daily.      • lubiprostone (AMITIZA) 8 MCG capsule Take 1 capsule by mouth 2 (Two) Times a Day With Meals. 60 capsule 5    • metoprolol tartrate (LOPRESSOR) 25 MG tablet Take 25 mg by mouth 2 (Two) Times a Day.      • montelukast (SINGULAIR) 10 MG tablet   3    • polyethylene glycol (MIRALAX) packet Take 17 g by mouth Daily.      • pravastatin (PRAVACHOL) 10 MG tablet Take 5 mg by mouth Daily.      • PROAIR  (90 BASE) MCG/ACT inhaler 2 puffs every 4 (four) hours as needed.  1    • Probiotic Product (PROBIOTIC DAILY PO) Take  by mouth.      • promethazine (PHENERGAN) 12.5 MG tablet Take 1 tablet by mouth every 6 (six) hours as needed for nausea or vomiting. 46 tablet 0    • simethicone (MYLICON) 125 MG chewable tablet Chew 125 mg Every 6 (Six) Hours As Needed for flatulence.      • SUMAtriptan (IMITREX) 100 MG tablet Take one tablet at onset of headache. May repeat dose one time in 2 hours if headache not relieved.  4    • valsartan (DIOVAN) 80 MG tablet Take 160 mg by mouth every morning. LIKES TO TAKE TWO 80 MG TABS  3    • vitamin D (ERGOCALCIFEROL) 63897 UNITS capsule capsule 50,000 Units  "every 7 days.  3        Allergies:  Latex, Neosporin [neomycin-bacitracin zn-polymyx], Sulfa antibiotics, Meclizine, Codeine, and Keflex [cephalexin]    ROS:    Pertinent items are noted in HPI, all other systems reviewed and negative     Objective     Blood pressure 142/87, pulse 95, resp. rate 13, height 170.2 cm (67\"), weight 77.1 kg (170 lb), SpO2 98 %, not currently breastfeeding.    Physical Exam   Constitutional: Pt is oriented to person, place, and time and well-developed, well-nourished, and in no distress.   Mouth/Throat: Oropharynx is clear and moist.   Neck: Normal range of motion.   Cardiovascular: Normal rate, regular rhythm    Pulmonary/Chest: Effort normal    Abdominal: Soft. Nontender  Skin: Skin is warm and dry.   Psychiatric: Mood, memory, affect and judgment normal.     Assessment/Plan     Diagnosis:  history of adenomatous polyps    Anticipated Surgical Procedure:  colonoscopy    The risks, benefits, and alternatives of this procedure have been discussed with the patient or the responsible party- the patient understands and agrees to proceed.                                                              "

## 2021-06-02 NOTE — ANESTHESIA POSTPROCEDURE EVALUATION
Patient: Debo Blair    Procedure Summary     Date: 06/02/21 Room / Location:  [facility] /  [facility]    Anesthesia Start: 0959 Anesthesia Stop: 1041    Procedure: COLONOSCOPY to anastamosis and cecum with cold/hot snare polypectomies (N/A ) Diagnosis:       Adenomatous polyp of colon, unspecified part of colon      (Adenomatous polyp of colon, unspecified part of colon [D12.6])    Surgeons: Luna Becerra MD Provider: Demetris Dominguez MD    Anesthesia Type: MAC ASA Status: 2          Anesthesia Type: MAC    Vitals  Vitals Value Taken Time   BP 91/57 06/02/21 1037   Temp     Pulse 88 06/02/21 1037   Resp 16 06/02/21 1037   SpO2 97 % 06/02/21 1037           Post Anesthesia Care and Evaluation    Patient location during evaluation: bedside  Patient participation: complete - patient participated  Level of consciousness: responsive to light touch, responsive to verbal stimuli and sleepy but conscious  Pain score: 0  Pain management: adequate  Airway patency: patent  Anesthetic complications: No anesthetic complications  PONV Status: none  Cardiovascular status: acceptable and hemodynamically stable  Respiratory status: acceptable  Hydration status: acceptable

## 2021-06-03 LAB
LAB AP CASE REPORT: NORMAL
PATH REPORT.FINAL DX SPEC: NORMAL
PATH REPORT.GROSS SPEC: NORMAL

## 2021-06-04 NOTE — PROGRESS NOTES
2 of The polyp(s) biopsies showed adenomatous change. This is not cancerous but is considered potentially precancerous. Follow-up colonoscopy in 5 years is advised.

## 2021-06-11 ENCOUNTER — TELEPHONE (OUTPATIENT)
Dept: GASTROENTEROLOGY | Facility: CLINIC | Age: 76
End: 2021-06-11

## 2021-06-11 NOTE — TELEPHONE ENCOUNTER
----- Message from Brenna Tolbert sent at 6/11/2021 12:50 PM EDT -----  Regarding: Results  Contact: [phone]  Pt is calling for her results

## 2021-06-11 NOTE — TELEPHONE ENCOUNTER
----- Message from Luna Becerra MD sent at 6/4/2021  8:48 AM EDT -----  2 of The polyp(s) biopsies showed adenomatous change. This is not cancerous but is considered potentially precancerous. Follow-up colonoscopy in 5 years is advised.

## 2021-06-11 NOTE — TELEPHONE ENCOUNTER
"Call to pt.  Advise per Dr Becerra note.  Verb understanding.     Asks to double check 5 yr recall \"with my history\".  Grandmother and aunt had polyps \"that they didn't catch in time.   Advise that f/u based on professional society recommendations.  Asks to double check.     Message to Dr Becerra.     C/s for 6/2/26 placed in recall and HM.   "

## 2022-03-02 ENCOUNTER — TREATMENT (OUTPATIENT)
Dept: PHYSICAL THERAPY | Facility: CLINIC | Age: 77
End: 2022-03-02

## 2022-03-02 DIAGNOSIS — M54.50 LUMBAR PAIN WITH RADIATION DOWN LEFT LEG: Primary | ICD-10-CM

## 2022-03-02 DIAGNOSIS — M79.605 LUMBAR PAIN WITH RADIATION DOWN LEFT LEG: Primary | ICD-10-CM

## 2022-03-02 DIAGNOSIS — M25.552 LEFT HIP PAIN: ICD-10-CM

## 2022-03-02 PROCEDURE — 97110 THERAPEUTIC EXERCISES: CPT | Performed by: PHYSICAL THERAPIST

## 2022-03-02 PROCEDURE — 97162 PT EVAL MOD COMPLEX 30 MIN: CPT | Performed by: PHYSICAL THERAPIST

## 2022-03-02 NOTE — PROGRESS NOTES
Physical Therapy Initial Evaluation and Plan of Care      Patient: Debo Blair   : 1945  Diagnosis/ICD-10 Code:  Lumbar pain with radiation down left leg [M54.50, M79.605]  Referring practitioner: Demetris Roper MD  Date of Initial Visit: 3/2/2022  Today's Date: 3/2/2022  Patient seen for 1 sessions           Subjective Evaluation    History of Present Illness  Onset date: 2021.  Mechanism of injury: She stepped on hornet nest last July and was bit several times.  She fell and landed on her right leg trying to avoid landing on her left hip. She had a compressed fracture in the upper back.  She finished series of 3 lumbar epidural last Thursday.   She has trouble getting in and out of car that is a Honda Civic due to back and hip.  She has a swivel seat.  She wears a left hip brace, left knee wrap, back brace.  PMH:  Cervical bone spurs, labral tears in L more than R hip. The left hip feels unstable and worse if she does too much. The left hip feels like it could give out. Lumbar spinal stenosis.  She does neck exercises at home. She uses band  Her son helps with going to the store.   She will not walk to the mailbox that is 1 block away. Pain increased with vacuuming, changing sheets, making bed  She takes Flexeril as needed, Tylenol  She takes Xanax, meds for blood pressure, allergic to Gabapentin    MRI Lumbar:  Central canal narrowing L4/5 contacts R L5 nerve root, L2/3 disc bulge contacting the L3 nerve root.  Moderate left foraminal narrowing.  Cervical:  Multilevel DDD, canal narrowing greatest C4/5 and less C5/6, C6/7.        Patient Occupation: Retired nurse  Pain  Current pain ratin (Lower back tight )  At best pain ratin (In bed)  At worst pain ratin (She forgot her back brace)  Relieving factors: change in position, ice and rest  Aggravating factors: ambulation (sitting)    Social Support  Lives in: one-story house (Basement to furnace)  Lives with:  alone    Treatments  Previous treatment: physical therapy and injection treatment  Patient Goals  Patient goals for therapy: decreased pain and increased motion             Objective          Static Posture   General Observations  Scoliosis.     Thoracic Spine  Hyperkyphosis.    Lumbar Spine   Increased lordosis.     Pelvis   Pelvis (Left): Depressed.   Pelvis (Right): Elevated.     Active Range of Motion     Lumbar   Flexion: WFL  Extension: Active lumbar extension: Pulls right lumbar at full stretch.   Left lateral flexion: WFL  Right lateral flexion: WFL  Left rotation: WFL  Right rotation: WFL    Passive Range of Motion   Left Hip   Flexion: 90 degrees with pain  Internal rotation (90/90): 15 degrees with pain    Strength/Myotome Testing     Left Hip   Planes of Motion   Flexion: 3+  Abduction: 4    Right Hip   Planes of Motion   Flexion: 4-  Abduction: 4+    Left Knee   Flexion: 4+  Extension: 5    Right Knee   Flexion: 4+  Extension: 5    Left Ankle/Foot   Dorsiflexion: 5  Plantar flexion: 4    Right Ankle/Foot   Dorsiflexion: 5  Plantar flexion: 4    Additional Strength Details  Transverse abdominus 3/5  Able to bridge hips 3/5      Ambulation   Weight-Bearing Status   Assistive device used: single point cane (Back brace, left hip brace, left knee wrap)    Observational Gait   Gait: antalgic     Functional Assessment     Single Leg Stance   Left: 3 seconds        See Exercise, Manual, and Modality Logs for complete treatment.   Reviewed her written HEP that was given to her by MD.  Lumbar extension feels good.    Functional Outcome Score: Modified Oswestry score 62% disability and LEFS 31% ability that is 69% disability         Assessment & Plan     Assessment  Impairments: abnormal gait, abnormal or restricted ROM, activity intolerance, impaired balance, impaired physical strength, lacks appropriate home exercise program and pain with function  Functional Limitations: lifting, sleeping, walking,  uncomfortable because of pain, sitting, standing, stooping and unable to perform repetitive tasks  Assessment details: Debo Blair is a 76 y.o. year-old female referred to physical therapy for lower back pain with left radiculopathy due to spinal stenosis. She presents with a evolving clinical presentation because the left hip may feel unstable to her while walking and she relies on cane and braces outside the house.  She has comorbidities of left hip pain and personal factors of living alone and only goes out for necessary appointments that may affect her progress in the plan of care.  Signs and symptoms are consistent with physical therapy diagnosis of chronic lower back pain that radiates down the left leg with left hip pain that would benefit from current x-ray to assess joint integrity. She will benefit from aquatic therapy for strength and mobility.  Prognosis: good    Goals  Plan Goals: Date to achieve goals:  05/31/22    Date to achieve STGs:  04/12/22    STG 1 Patient will perform aquatic therapy exercises for lumbar and hip ROM/flexibility, strength and conditioning without increased pain.    STG 2 Patient will demonstrate good postural awareness with standing and walking unsupported in pool for 5 minutes    STG 3 Patient will report decreased pain following aquatic therapy session by 1 point on VAS    STG 4 Patient will increase B hip strength to at least 4-/5 to improve stability with walking    Date to achieve LTGs:  05/31/22    LTG 1 Patient will demonstrate an independent aquatic HEP for lumbar and hip strength,  ROM/flexibility, conditioning and balance with community resources     LTG 2 Patient will demonstrate increased core and hip strength and balance with dynamic movements     LTG 3 Patient will report decreased functional disability on Modified Oswestry score from 62 % to 52 % or less    LTG 4 Patient will report decreased incidence of left hip feels unstable by at least 25%    LTG 5  Patient will increase B hip strength to at least 4/5 to improve stability with walking    LTG 6 Patient will report decreased pain to 5/10 or less with ADLs    LTG 7 Patient will report increased ease with car transfer by 25%      Plan  Therapy options: will be seen for skilled therapy services  Other planned modality interventions: Aquatic therapy  Planned therapy interventions: abdominal trunk stabilization, balance/weight-bearing training, strengthening, postural training, neuromuscular re-education, home exercise program, therapeutic activities and stretching  Frequency: 2x week  Duration in weeks: 12  Treatment plan discussed with: patient  Plan details: Aquatic therapy for core and hip strength, careful with left hip ROM initially   She does not like to raise up on her toes due to history of stress fracture           Timed:  Manual Therapy:    0     mins  60407;  Therapeutic Exercise:    15     mins  00058;     Neuromuscular Yoon:    0    mins  69155;    Therapeutic Activity:     0     mins  69806;     Gait Trainin     mins  75322;     Ultrasound:     0     mins  56921;    Iontophoresis    0     mins 32004  Dry Needling   0     mins 82827/ 26074 (Self-pay)      Untimed:  Electrical Stimulation:    0     mins  44651 ( );  Traction:  0     mins  84053;   Low Eval     0     Mins  04283  Mod Eval     28     Mins  64789  High Eval                       0     Mins  46382    Timed Treatment:   15   mins   Total Treatment:     43   mins    PT SIGNATURE: Janell Clifford PT     License Number: [id number]    Electronically signed by Janell Clifford PT, 22, 9:31 AM EST    DATE TREATMENT INITIATED: 3/2/2022    Medicare Initial Certification  Certification Period: 2022  I certify that the therapy services are furnished while this patient is under my care.  The services outlined above are required by this patient, and will be reviewed every 90 days.     PHYSICIAN: Demetris Roper MD   NPI:  [id number]                                         DATE:     Please sign and return via fax to [phone] Thank you, [facility].

## 2022-03-04 ENCOUNTER — TREATMENT (OUTPATIENT)
Dept: PHYSICAL THERAPY | Facility: CLINIC | Age: 77
End: 2022-03-04

## 2022-03-04 DIAGNOSIS — M79.605 LUMBAR PAIN WITH RADIATION DOWN LEFT LEG: Primary | ICD-10-CM

## 2022-03-04 DIAGNOSIS — M54.50 LUMBAR PAIN WITH RADIATION DOWN LEFT LEG: Primary | ICD-10-CM

## 2022-03-04 DIAGNOSIS — M25.552 LEFT HIP PAIN: ICD-10-CM

## 2022-03-04 PROCEDURE — 97113 AQUATIC THERAPY/EXERCISES: CPT | Performed by: PHYSICAL THERAPIST

## 2022-03-04 NOTE — PROGRESS NOTES
Physical Therapy Daily Progress Note    Patient: Debo Blair   : 1945  Diagnosis/ICD-10 Code:  Lumbar pain with radiation down left leg [M54.50, M79.605]  Referring practitioner: Demetris Roper MD  Date of Initial Visit: Type: THERAPY  Noted: 3/2/2022  Today's Date: 3/4/2022  Patient seen for 2 sessions             Subjective Evaluation    History of Present Illness    Subjective comment: L leg is my problem child but it's doing pretty good this am, my R LB/post hip is what's bothering me today - pain 6/10.          Objective   .   AQUATIC EX:    Water Walk   Forwards, sideways, and backwards x 2 laps ea  Stretch 1   HS w/ heel propped on pool bench 20 sec x 2  Stretch 2   Piriformis 20 sec x 2 (standing), 20 sec x 2 sitting  Stretch 3   Calf 20 sec x 2   Stretch Other 1  -  Stretch Other 2  -  Vertical Traction  LN/rail x 3 min  Abdominals   LN x 12  Clams    -  Hip Abd/Add   10x ea alternating  Hip Ext   *Next (small ROM w/ gluteal squeeze)  March in Place  5x, stopped 2/2 to increased pain L hip   Mini Squat   10x  Toe/Heel Raises    Uni-Squat   -  Uni-Clock   -  Step Ups   -  Bicycle   2 min, seated, comfortable ROM  Flutter/Scissor  , comfortable ROM  Exercise 1   -  Exercise 2   -  Exercise 3   -  Exercise 4   -  Exercise 5  -  Exercise 6  -      Assessment & Plan     Assessment    Assessment details: Patient seen today for initial aquatic therapy session including education and instruction in basic aquatic ex/activity for mobility, flexibility, and strength/stabilization.  She noted mild soreness R LB/post hip toward end of walking but stated pain was not as bad as when she arrived today.  She reported increase in L hip pain during performance of MIP so stopped after 5 reps.  She seemed to like the warm water and did fairly well with aquatic ex/activity today.  PT provided demonstration and cuing throughout session for optimal posture, core/glut activation, and correct  form/technique with ex/activity.    Plan:  Assess response to initial aquatic session and modify/progress as appropriate.                  Timed:  Aquatic Therapy    40     mins 73057;    Rula Marcos PT  Physical Therapist    [place] License: [id number]

## 2022-03-10 ENCOUNTER — TREATMENT (OUTPATIENT)
Dept: PHYSICAL THERAPY | Facility: CLINIC | Age: 77
End: 2022-03-10

## 2022-03-10 DIAGNOSIS — M25.552 LEFT HIP PAIN: ICD-10-CM

## 2022-03-10 DIAGNOSIS — M54.50 LUMBAR PAIN WITH RADIATION DOWN LEFT LEG: Primary | ICD-10-CM

## 2022-03-10 DIAGNOSIS — M79.605 LUMBAR PAIN WITH RADIATION DOWN LEFT LEG: Primary | ICD-10-CM

## 2022-03-10 PROCEDURE — 97113 AQUATIC THERAPY/EXERCISES: CPT | Performed by: PHYSICAL THERAPIST

## 2022-03-10 NOTE — PROGRESS NOTES
Physical Therapy Daily Progress Note    Patient: Debo Blair   : 1945  Diagnosis/ICD-10 Code:  Lumbar pain with radiation down left leg [M54.50, M79.605]  Referring practitioner: Demetris Roper MD  Date of Initial Visit: Type: THERAPY  Noted: 3/2/2022  Today's Date: 3/17/2022  Patient seen for 3 sessions             Subjective Evaluation    History of Present Illness    Subjective comment: My L hip/knee are bothering me this am but might be the cold front on the way because I do have arthritis and usually start to feel it in my joints a day or 2 before the change in weather.  Legs felt rubbery for about 45 min after first therapy session and pain got up to 10/10 so took med and ended up sleeping 13 hours straight.  Pain gradually decreased over next few days, 3/10 this morning.        Objective      AQUATIC EX:     Water Walk                 Forwards, sideways x 2 laps ea  Stretch 1                      HS w/ heel propped on pool bench 20 sec x 2  Stretch 2                      Piriformis 20 sec x 2 sitting  Stretch 3                      Calf 30 sec x 2   Stretch Other 1           *Next? Wall   Stretch Other 2           -  Vertical Traction          LN/rail 2 x 2-3 min, chair position  Abdominals                 LN x 12  Clams                          -  Hip Abd/Add                10x ea  Hip Ext                        -  March in Place            Held 2/2 pain -  Mini Squat                   12x  Toe/Heel Raises           Uni-Squat                    -  Uni-Clock                    -  Step Ups                     -  Bicycle                         1 min, seated, comfortable ROM  Flutter/Scissor             - / 15, comfortable ROM  Exercise 1                   Seated alt LAQ w/ ankle DF x 10 ea  Exercise 2                   -  Exercise 3                   -  Exercise 4                   -  Exercise 5                   -  Exercise 6                   -  Ended session sitting at jets in therapy  "pool x 5 min       Assessment & Plan     Assessment    Assessment details: Patient reports her legs felt \"rubbery\" and her pain increased to 10/10 after her first aquatic session.  She states she took hot shower, took meds and laid down sleeping 13 hours straight.  Her pain slowly decreased over the next few days and she rates it 3/10 today.  Continued with basic aquatic ex/activity for mobility, flexibility, and strength/stabilization.  Omitted a few ex from last session and tried seated LAQ w/ DF this visit.  Decompression float seemed to be most comfortable with LE in chair position once she was able to relax vs actively tensing muscles to hold legs up.  Cuing and demonstration provided throughout session for optimal posture, core/glut activation, and correct form/technique with ex/activity.  Post exercise while sitting at jets (in therapy pool), patient rated her pain 2/10.    Plan:  Continue to assess patient response to aquatic ex/activity and modify/progress as appropriate.                  Timed:  Aquatic Therapy    38     mins 11491;    Rula Marcos PT  Physical Therapist    [place] License: [id number]  "

## 2022-03-18 ENCOUNTER — TREATMENT (OUTPATIENT)
Dept: PHYSICAL THERAPY | Facility: CLINIC | Age: 77
End: 2022-03-18

## 2022-03-18 DIAGNOSIS — M25.552 LEFT HIP PAIN: ICD-10-CM

## 2022-03-18 DIAGNOSIS — M54.50 LUMBAR PAIN WITH RADIATION DOWN LEFT LEG: Primary | ICD-10-CM

## 2022-03-18 DIAGNOSIS — M79.605 LUMBAR PAIN WITH RADIATION DOWN LEFT LEG: Primary | ICD-10-CM

## 2022-03-18 PROCEDURE — 97113 AQUATIC THERAPY/EXERCISES: CPT | Performed by: PHYSICAL THERAPIST

## 2022-03-18 NOTE — PROGRESS NOTES
"Physical Therapy Daily Progress Note    Patient: Debo Blair   : 1945  Diagnosis/ICD-10 Code:  Lumbar pain with radiation down left leg [M54.50, M79.605]  Referring practitioner: Demetris Roper MD  Date of Initial Visit: Type: THERAPY  Noted: 3/2/2022  Today's Date: 3/18/2022  Patient seen for 4 sessions             Subjective Evaluation    History of Present Illness    Subjective comment: I did better after last session.  Pain this am about 2/10.  I got bad news from MD - told me my L hip is \"bone on bone\" and recommending total hip replacement (anterior).       Objective     AQUATIC EX:     Water Walk                 Forwards, sideways x 2 laps ea  Stretch 1                      HS w/ heel propped on pool bench 20 sec x 2  Stretch 2                      Piriformis 20 sec x 2 sitting  Stretch 3                      Calf 30 sec x 2   Stretch Other 1           Wall 30 sec x 2  Stretch Other 2           -  Hip sweeps  10x R, 7x L w/ SN under flexed knee, comfortable range  Vertical Traction          LN/rail 2 x 2-3 min, chair position  UTR w/ noodle 10x to ea side  Abdominals                 LN x 15  Clams                         15, seated  Hip Abd/Add                12x ea  Hip Ext                        -  March in Place            attempted but held 2/2 pain  Mini Squat                   12x  Toe/Heel Raises         15 / 15  Uni-Squat                    -  Uni-Clock                    -  Step Ups                     -  Bicycle                         1 min, seated, comfortable ROM  Flutter/Scissor             - / 15, comfortable ROM  Exercise 1                   Seated alt LAQ w/ ankle DF x 10 ea  Exercise 2                   At paddle rows x 10 ea arm (closed paddles)  Exercise 3                   -  Exercise 4                   -  Exercise 5                   -  Exercise 6                   -  Ended session sitting at jets in therapy pool x 5 min       Assessment & Plan     Assessment    Assessment " "details: Patient reports doing better after ast thrapy session.  She saw MD and was told her L hip is \"bone on bone\" and MD recommends JESSY (anterior).  Continued with basic aquatic ex/activity for mobility, flexibility, and strength/stabilization.  Increased reps on a few ex and added UTR, hip sweeps, and alt rows this visit.  She was limited in hip ROM / reps on L hip sweep 2/2 hip discomfort.  Cuing and demonstration provided throughout session for optimal posture, core/glut activation, and correct form/technique with ex/activity.      Plan:  Continue to assess patient response to aquatic ex/activity and modify/progress as appropriate.                  Timed:  Aquatic Therapy    41     mins 46773;    Rula Marcos PT  Physical Therapist    [place] License: [id number]  "

## 2022-04-01 ENCOUNTER — TREATMENT (OUTPATIENT)
Dept: PHYSICAL THERAPY | Facility: CLINIC | Age: 77
End: 2022-04-01

## 2022-04-01 DIAGNOSIS — M54.50 LUMBAR PAIN WITH RADIATION DOWN LEFT LEG: Primary | ICD-10-CM

## 2022-04-01 DIAGNOSIS — M79.605 LUMBAR PAIN WITH RADIATION DOWN LEFT LEG: Primary | ICD-10-CM

## 2022-04-01 DIAGNOSIS — M25.552 LEFT HIP PAIN: ICD-10-CM

## 2022-04-01 PROCEDURE — 97113 AQUATIC THERAPY/EXERCISES: CPT | Performed by: PHYSICAL THERAPIST

## 2022-04-01 NOTE — PROGRESS NOTES
Physical Therapy 30-Day Progress Note         Patient: Debo Blair   : 1945  Diagnosis/ICD-10 Code:  Lumbar pain with radiation down left leg [M54.50, M79.605]  Referring practitioner: Demetris Roper MD  Date of Initial Visit: Type: THERAPY  Noted: 3/2/2022  Today's Date: 2022  Patient seen for 5 sessions      Subjective:     Clinical Progress: improved  Home Program Compliance:  Reports doing some land ex as able, no pool access for aquatic ex  Treatment has included:  therapeutic exercise, therapeutic activity, aquatic therapy and patient education with home exercise program     Subjective Evaluation    History of Present Illness    Subjective comment: Pain 2-3/10 this morning.  I vacuumed the other day.  Scheduled for hip injection next Friday.  I'm not ready for hip replacement yet but thinking maybe sometime over the summer.  My next spine injection is sometime in May.      Objective     Posture: FH/rounded shoulders, increased kyphosis, increased lordosis, R IC higher than L with noted Scoliosis.     Active Range of Motion:   Lumbar AROM WFL all planes except lumbar extension: notes pull/stretch.   Hip Flexion: L 105 degrees with pain (sitting), R 113 degrees  Internal rotation (90/90): L 13 degrees with pain, R 22 degrees (sitting)     MMT:  Hip Flexion: L 4-/5, R 4/5  Hip Abduction: L 4/5, R 4+/5 (assessed standing poolside w/ light B UE support)  Hip Extension: L 4-/5, R 4/5 (assessed standing poolside w/ light B UE support)  Knee Flexion: 4+/5, B  Knee Extension: 5/5, B  Ankle/Foot Dorsiflexion: 5/5, B  Ankle/Foot Plantar flexion: 4/5, B  Core:  grossly 3+/5    Ambulation Weight-Bearing Status Assistive device used: Not using AD, uses Back brace, left hip brace, left knee wrap when she goes out.     Gait:  Antaligic without AD Gait: antalgic     Walking tolerance:  10-15 min     Single Leg Stance L 3 sec, R  4 sec w/ compensation B (assessed barefoot poolside)     Functional Outcome Score:    Modified Oswestry score 58% disability (was 62% at eval)   LEFS 39/80 or 48.75% ability or 51.25% disability (was 31% ability or 69% disability at eval)    AQUATIC EX:     Water Walk                 Forwards, sideways x 2 laps ea, 1 lap backward  Stretch 1                      HS w/ heel propped on pool bench 20 sec x 2  Stretch 2                      Piriformis 20 sec x 2 sitting  Stretch 3                      Calf 30 sec x 2 - *deferred  Stretch Other 1           Wall 30 sec x 2  Stretch Other 2           -  Hip sweeps                 10x R, 7x L w/ SN under flexed knee, comfortable range - *deferred  Vertical Traction          LN/rail 2 x 2-3 min, chair position  UTR w/ noodle            10x to ea side  Abdominals                 LN x 15  Clams                         15, seated  Hip Abd/Add                12x ea  Hip Ext                        -  March in Place            5x, stopped 2/2 beginning to bother L hip  Mini Squat                   12x  Toe/Heel Raises         15 / 15  Uni-Squat                    -  Uni-Clock                    -  Step Ups                     -  Bicycle                         1 min, seated, comfortable ROM  Flutter/Scissor             - / 15, comfortable ROM  Exercise 1                   Seated alt LAQ w/ ankle DF x 10 ea  Exercise 2                   At paddle rows x 10 ea arm (closed paddles)  Exercise 3                   -  Exercise 4                   -  Exercise 5                   -  Exercise 6                   -  Ended session sitting at jets in therapy pool x 5 min    Assessment & Plan     Assessment    Assessment details: Debo Blair is a 76 y.o. F who has attended 4 aquatic theapy sessions since her evaluation on 3/2/2022 for lower back pain with left radiculopathy due to spinal stenosis. She has comorbidities /personal factors of L hip pain (JESSY has been recommended), lives alone, and only goes out for necessary appointments that may affect her progress in the  "plan of care.  She continues to rely on cane and braces/wraps (for back, hip, knee) with community mobility but is doing better walking in home and has been able to walk out to get mail / take garbage out to curb but walking the incline driveway will aggravate the L hip.  She states she has not had any recent episodes of L hip feeling like it's going to give way on her.  She indicates transition in/out of car is still the most aggravating activity and she limits her errands to no more than 1-2 per trip.  She still gets \"hot poker\" sensation occasionally.  She is now able to lie on her L side at night and is taking Ibuprofen less often.  She reports overall improvement of 25-35% since she started therapy.  She has met/partially met 3 of 4 STGs and 2 of 7 LTGs with remaining STGs/LTGs ongoing.   Patient is appropriate for continuation of skilled therapy to help reduce pain and improve functional mobility / activity tolerance.           Goals  Plan Goals: Date to achieve STGs:  04/12/22  STG 1 Patient will perform aquatic therapy exercises for lumbar and hip ROM/flexibility, strength and conditioning without increased pain.  Met, typically feels better for rest of day  STG 2 Patient will demonstrate good postural awareness with standing and walking unsupported in pool for 5 minutes.  Ongoing, improving postural awareness  STG 3 Patient will report decreased pain following aquatic therapy session by 1 point on VAS.  Met   STG 4 Patient will increase B hip strength to at least 4-/5 to improve stability with walking. Partially Met, reports L hip hasn't felt like it was going to give out on her like it was.  Date to achieve LTGs:  05/31/22  LTG 1 Patient will demonstrate an independent aquatic HEP for lumbar and hip strength,  ROM/flexibility, conditioning and balance with community resources.  Ongoing  LTG 2 Patient will demonstrate increased core and hip strength and balance with dynamic movements.  Ongoing  LTG 3 " Patient will report decreased functional disability on Modified Oswestry score from 62 % to 52 % or less.  Met, Oswestry score = 38% today  LTG 4 Patient will report decreased incidence of left hip feels unstable by at least 25%.  Ongoing  LTG 5 Patient will increase B hip strength to at least 4/5 to improve stability with walking.  Ongoing  LTG 6 Patient will report decreased pain to 5/10 or less with ADLs.  Partially Met, pain reported 2-3/10 today but patient self limits activity  LTG 7 Patient will report increased ease with car transfer by 25%.  Ongoing, does okay if has wrapped with hip/knee supports and back brace but if does more than 1 errand, it will aggravate               Recommendations: Continue as planned  Timeframe: 1 month  Prognosis to achieve goals: good    PT Signature: Rula Marcos PT  [place] License:  [id number]      Based upon review of the patient's progress and continued therapy plan, it is my medical opinion that Debo Blair should continue physical therapy treatment at [facility]  [address] [place] [address] [address] [place] [address] [phone].    Signature: __________________________________  Demetris Roper MD  NPI: [id number]                                          Timed:  Aquatic therapy  91560    45   mins

## 2022-04-05 ENCOUNTER — TREATMENT (OUTPATIENT)
Dept: PHYSICAL THERAPY | Facility: CLINIC | Age: 77
End: 2022-04-05

## 2022-04-05 DIAGNOSIS — M25.552 LEFT HIP PAIN: ICD-10-CM

## 2022-04-05 DIAGNOSIS — M79.605 LUMBAR PAIN WITH RADIATION DOWN LEFT LEG: Primary | ICD-10-CM

## 2022-04-05 DIAGNOSIS — M54.50 LUMBAR PAIN WITH RADIATION DOWN LEFT LEG: Primary | ICD-10-CM

## 2022-04-05 PROCEDURE — 97113 AQUATIC THERAPY/EXERCISES: CPT | Performed by: PHYSICAL THERAPIST

## 2022-04-05 NOTE — PROGRESS NOTES
Physical Therapy Daily Progress Note    Patient: Debo Blair   : 1945  Diagnosis/ICD-10 Code:  Lumbar pain with radiation down left leg [M54.50, M79.605]  Referring practitioner: Demetris Roper MD  Date of Initial Visit: Type: THERAPY  Noted: 3/2/2022  Today's Date: 2022  Patient seen for 6 sessions             Subjective Evaluation    History of Present Illness    Subjective comment: Walking to mailbox and back hasn't really bothered me much.  I ended up cancelling my hip injection.  Trying to get up and move instead of sitting too much.       Objective     AQUATIC EX:     Water Walk                 Forwards, sideways x 2 laps ea, 1 lap backward  Stretch 1                      HS w/ heel propped on pool bench 20 sec x 2  Stretch 2                      Piriformis 20 sec x 2 sitting  Stretch 3                      Calf 30 sec x 2 - *deferred  Stretch Other 1           Wall 30 sec x 2  Stretch Other 2           -  Hip sweeps                 10x ea w/ SN under flexed knee, comfortable range   Vertical Traction          LN/rail 2 x 2-3 min, chair position  UTR w/ noodle            10x to ea side  Abdominals                 LN x 15  Clams                         15, seated  Hip Abd/Add                12x ea  Hip Ext                        10x, tighten thigh/squeeze gluts  March in Place            5x, stopped 2/2 beginning to bother L hip - *deferred  Mini Squat                   15x  Toe/Heel Raises         15 / 15  Uni-Squat                    -  Uni-Clock                    -  Step Ups                     -  Bicycle                         1 min, seated, comfortable ROM  Flutter/Scissor             - / 15, comfortable ROM  Exercise 1                   Seated alt LAQ w/ ankle DF x 12 ea  Exercise 2                   At paddle rows x 12 ea arm (closed paddles)  Exercise 3                   Paddle stirs x 10 ea direction (closed paddles  Exercise 4                   -  Exercise  5                   -  Exercise 6                   -  Ended session sitting at jets in therapy pool x 5 min    Assessment & Plan     Assessment    Assessment details: Patient reports trying to get up and move more instead of sitting for long.  She states she cancelled her hip injection since her L hip is feeling some better.  She also indicates she is still uncertain about proceeding with hip replacement.  Continued with basic aquatic ex/activity for mobility, flexibility, and strength/stabilization.  Increased reps on a few ex and added hip extension, paddle stirs this visit.  She was able to complete 10 reps on L hip sweep but in smaller ROM than on R hip sweep.  Cuing and demonstration provided prn throughout session for optimal posture, core/glut activation, and correct form/technique with ex/activity.      Plan:  Continue to assess patient response to aquatic ex/activity and modify/progress as appropriate.  May consider adding step ups.                 Timed:  Aquatic Therapy    43     mins [id number];    Rula Marcos PT  Physical Therapist    [place] License: [id number]

## 2022-04-08 ENCOUNTER — TREATMENT (OUTPATIENT)
Dept: PHYSICAL THERAPY | Facility: CLINIC | Age: 77
End: 2022-04-08

## 2022-04-08 DIAGNOSIS — M54.50 LUMBAR PAIN WITH RADIATION DOWN LEFT LEG: Primary | ICD-10-CM

## 2022-04-08 DIAGNOSIS — M79.605 LUMBAR PAIN WITH RADIATION DOWN LEFT LEG: Primary | ICD-10-CM

## 2022-04-08 DIAGNOSIS — M25.552 LEFT HIP PAIN: ICD-10-CM

## 2022-04-08 PROCEDURE — 97113 AQUATIC THERAPY/EXERCISES: CPT | Performed by: PHYSICAL THERAPIST

## 2022-04-08 NOTE — PROGRESS NOTES
Physical Therapy Daily Progress Note    Patient: Debo Blair   : 1945  Diagnosis/ICD-10 Code:  Lumbar pain with radiation down left leg [M54.50, M79.605]  Referring practitioner: Demetris Roper MD  Date of Initial Visit: Type: THERAPY  Noted: 3/2/2022  Today's Date: 2022  Patient seen for 7 sessions             Subjective Evaluation    History of Present Illness    Subjective comment: I worked out in the yard some yesterday and L leg/knee are hurting more today - don't know if it's from what I did yesterday or the weather coming in or a combination of both.  I had a tree brach hit me on the head while I was working out in the yard yesterday but pretty sure I'm okay, just sore.  I think part of my problem is when I'm feeling better, I tend to do too much.       Objective     AQUATIC EX:     Water Walk                 Forwards, sideways, backward x 2 laps  Stretch 1                      HS w/ heel propped on pool bench 20 sec x 2  Stretch 2                      Piriformis 20 sec x 2 sitting  Stretch 3                      Calf 30 sec x 2 - *deferred  Stretch Other 1           Wall 30 sec x 2  Stretch Other 2           -  Hip sweeps                 15x R, 10x L w/ SN under flexed knee, comfortable range   Vertical Traction          LN/rail 2 x 2-3 min, chair position  UTR w/ noodle            10x to ea side  Abdominals                 LN x 15  Clams                         15, seated  Hip Abd/Add                12x ea  Hip Ext                        12x, tighten thigh/squeeze gluts  March in Place            5x, stopped 2/2 beginning to bother L hip - *deferred  Mini Squat                   15x  Toe/Heel Raises         15 / 15  Uni-Squat                    -  Uni-Clock                    -  Step Ups                     -  Bicycle                         1 min, seated, comfortable ROM  Flutter/Scissor             - / 15, comfortable ROM  Exercise 1                   Seated alt LAQ w/ ankle DF x 12  ea  Exercise 2                   At paddle rows x 15 ea arm (closed paddles)  Exercise 3                   Paddle stirs x 12 ea direction (closed paddles  Exercise 4                   -  Exercise 5                   -  Exercise 6                   -  Ended session sitting at jets in therapy pool x 5 min       Assessment & Plan     Assessment    Assessment details: Patient reports working in yard yesterday with increase in L hip/knee pain today.  Continued with most previous aquatic ex/activity for mobility, flexibility, and strength/stabilization.  Increased reps on a few ex this visit.  She was limited in ROM on L hip sweep and only completed 10 reps.  Cuing and demonstration provided prn throughout session for optimal posture, core/glut activation, and correct form/technique with ex/activity.  Discussed activity modification, finding balance between activity and rest/recovery, and gradually increasing activity level even when she's feeling better.      Plan:  Continue to assess patient response to aquatic ex/activity and modify/progress as appropriate.  May consider adding hip circles, small step ups, and /or more UE/core ex in future sessions.                 Timed:  Aquatic Therapy    43     mins [id number];    Rula Marcos PT  Physical Therapist    [place] License: [id number]

## 2022-04-12 ENCOUNTER — TREATMENT (OUTPATIENT)
Dept: PHYSICAL THERAPY | Facility: CLINIC | Age: 77
End: 2022-04-12

## 2022-04-12 DIAGNOSIS — M79.605 LUMBAR PAIN WITH RADIATION DOWN LEFT LEG: Primary | ICD-10-CM

## 2022-04-12 DIAGNOSIS — M25.552 LEFT HIP PAIN: ICD-10-CM

## 2022-04-12 DIAGNOSIS — M54.50 LUMBAR PAIN WITH RADIATION DOWN LEFT LEG: Primary | ICD-10-CM

## 2022-04-12 PROCEDURE — 97113 AQUATIC THERAPY/EXERCISES: CPT | Performed by: PHYSICAL THERAPIST

## 2022-04-12 NOTE — PROGRESS NOTES
"Physical Therapy Daily Progress Note    Patient: Debo Blair   : 1945  Diagnosis/ICD-10 Code:  Lumbar pain with radiation down left leg [M54.50, M79.605]  Referring practitioner: Demetris Roper MD  Date of Initial Visit: Type: THERAPY  Noted: 3/2/2022  Today's Date: 2022  Patient seen for 8 sessions             Subjective Evaluation    History of Present Illness    Subjective comment: Not sure what I did to my R foot/heel but it \"popped\" when I was walking in the house yesterday and is kind of hurting this morning so I don't want to do rasing up on my toes today.  I'm a little out of sorts this morning - took Lyrica yesterday for the first time.  It did help my hip/knee pain but don't really like the way it makes me feel - unsure if I will keep taking it.          Objective      AQUATIC EX:     Water Walk                 Forwards, sideways, backward x 1 lap ea  Stretch 1                      HS w/ heel propped on pool bench 20 sec x 2  Stretch 2                      Piriformis 20 sec x 2 sitting  Stretch 3                      Calf 30 sec x 2 - *deferred  Stretch Other 1           Wall 30 sec x 2  Stretch Other 2           -  Hip sweeps                 15x R, 10x L w/ SN under flexed knee, comfortable range   Vertical Traction          LN/rail 2 x 2-3 min, chair position  UTR w/ noodle            10x to ea side  Abdominals                 LN x 15  Clams                         15, seated  Hip Abd/Add                15x ea  Hip Ext                        15x, tighten thigh/squeeze gluts  March in Place            5x, stopped 2/2 beginning to bother L hip - *deferred  Mini Squat                   15x  Toe/Heel Raises         15 / 15 - *deferred  Uni-Squat                    -  Uni-Clock                    -  Step Ups                     -  Bicycle                         1 min, seated, comfortable ROM  Flutter/Scissor             - / 15, comfortable ROM  Exercise 1                   Seated alt LAQ w/ " "ankle DF x 12 ea  Exercise 2                   At paddle rows x 15 ea arm (closed paddles)  Exercise 3                   Paddle stirs x 12 ea direction (closed paddles  Exercise 4                   -  Exercise 5                   -  Exercise 6                   -  Ended session sitting at jets in therapy pool x 5 min    Assessment & Plan     Assessment    Assessment details: Patient reports R foot hurting some today after it \"popped\" walking in the house yesterday.  She states she took first dose of Lyrica yesterday which helped her hip/knee but she doesn't like the way it makes her feel.  Continued with most previous aquatic ex/activity for mobility, flexibility, and strength/stabilization.  Reduced water walking and omitted heel raises 2/2 foot pain/soreness.  Increased reps on a couple of LE ex this visit.  She required a little additional cuing and demonstration this session for optimal posture, core/glut activation, and correct form/technique with ex/activity due to being \"kind of out of sorts\" from medication.      Plan:  Continue with aquatic ex/activity progressing as appropriate/tolerated.  May consider adding hip circles, small step ups, and /or more UE/core ex in future sessions.                 Timed:  Aquatic Therapy    41     mins [id number];    Rula Marcos PT  Physical Therapist    [place] License: [id number]  "

## 2022-04-15 ENCOUNTER — TREATMENT (OUTPATIENT)
Dept: PHYSICAL THERAPY | Facility: CLINIC | Age: 77
End: 2022-04-15

## 2022-04-15 DIAGNOSIS — M25.552 LEFT HIP PAIN: ICD-10-CM

## 2022-04-15 DIAGNOSIS — M54.50 LUMBAR PAIN WITH RADIATION DOWN LEFT LEG: Primary | ICD-10-CM

## 2022-04-15 DIAGNOSIS — M79.605 LUMBAR PAIN WITH RADIATION DOWN LEFT LEG: Primary | ICD-10-CM

## 2022-04-15 PROCEDURE — 97113 AQUATIC THERAPY/EXERCISES: CPT | Performed by: PHYSICAL THERAPIST

## 2022-04-15 NOTE — PROGRESS NOTES
Physical Therapy Daily Progress Note    Patient: Debo Blair   : 1945  Diagnosis/ICD-10 Code:  Lumbar pain with radiation down left leg [M54.50, M79.605]  Referring practitioner: Demetris Roper MD  Date of Initial Visit: Type: THERAPY  Noted: 3/2/2022  Today's Date: 4/15/2022  Patient seen for 9 sessions             Subjective Evaluation    History of Present Illness    Subjective comment: I'm still babying my R foot, L elbow seems better with use of elbow brace/splint.  I'm thinking that rolling my windows up/down in car (don't have electric windows) might be contributing to my elbow issue.  Haven't taking pain med (or Lyrica) last few days - actually only took the Lyrica the one time.         Objective      AQUATIC EX:     Water Walk                 Forwards, sideways, backward x 2 laps  Stretch 1                      HS w/ heel propped on pool bench 20 sec x 2  Stretch 2                      Piriformis 20 sec x 2 sitting  Stretch 3                      Calf 30 sec x 2 - *deferred  Stretch Other 1           Wall 30 sec x 2  Stretch Other 2           -  Hip sweeps                 15x R, 10x L w/ SN under flexed knee, comfortable range   Vertical Traction          LN/rail 2 x 2-3 min, chair position  UTR w/ noodle            10x to ea side  Abdominals                 LN x 15  Clams                         15, seated  Hip Abd/Add                15x ea  Hip Ext                        15x, tighten thigh/squeeze gluts  March in Place            5x, stopped 2/2 beginning to bother L hip - *deferred  Mini Squat                   15x  Toe/Heel Raises         15 / 15 - *deferred  Uni-Squat                    -  Uni-Clock                    -  Step Ups                     -  Bicycle                         1 min, seated, comfortable ROM  Flutter/Scissor             - / 15, comfortable ROM  Exercise 1                   Seated alt LAQ w/ ankle DF x 12 ea  Exercise 2                   At paddle rows x 15 ea arm  (closed paddles)  Exercise 3                   Paddle stirs x 12 ea direction (closed paddles  Exercise 4                  UE alt flex/ext x 10 ea  Exercise 5                  UE Hz Abd x 10  Exercise 6                   -  Ended session sitting at jets in therapy pool x 5 min    Assessment & Plan     Assessment    Assessment details: Patient reports she's continuing to baby her R foot but her hip/knee are doing better.  She states she only took the one dose of Lyrica.  Continued with most previous aquatic ex/activity for mobility, flexibility, and strength/stabilization.  Omitted heel raises 2/2 continued foot pain/soreness today but did add STS and a couple of new UE/core ex this visit.  Min/moderate cuing required for optimal posture, core/glut activation, and correct form/technique with ex/activity this date (most notably with mini squats and STS).      Plan:  Continue with aquatic ex/activity progressing as appropriate/tolerated.  May consider adding uni-clock, hip circles, small step ups, and /or tuck ups in future sessions.                 Timed:  Aquatic Therapy    43     mins [id number];    Rula Marcos PT  Physical Therapist    [place] License: [id number]

## 2022-04-19 ENCOUNTER — TREATMENT (OUTPATIENT)
Dept: PHYSICAL THERAPY | Facility: CLINIC | Age: 77
End: 2022-04-19

## 2022-04-19 DIAGNOSIS — M54.50 LUMBAR PAIN WITH RADIATION DOWN LEFT LEG: Primary | ICD-10-CM

## 2022-04-19 DIAGNOSIS — M79.605 LUMBAR PAIN WITH RADIATION DOWN LEFT LEG: Primary | ICD-10-CM

## 2022-04-19 DIAGNOSIS — M25.552 LEFT HIP PAIN: ICD-10-CM

## 2022-04-19 PROCEDURE — 97113 AQUATIC THERAPY/EXERCISES: CPT | Performed by: PHYSICAL THERAPIST

## 2022-04-19 NOTE — PROGRESS NOTES
Physical Therapy Daily Progress Note    Patient: Debo Blair   : 1945  Diagnosis/ICD-10 Code:  Lumbar pain with radiation down left leg [M54.50, M79.605]  Referring practitioner: Demetris Roper MD  Date of Initial Visit: Type: THERAPY  Noted: 3/2/2022  Today's Date: 2022  Patient seen for 10 sessions             Subjective Evaluation    History of Present Illness    Subjective comment: R foot is still bothering me.  L elbow is better.  Pain 4/10, I can tell the rain is coming.  L hip still hurts with getting in/out of car.         Objective      AQUATIC EX:     Water Walk                 Forwards, sideways, backward x 2 laps  Stretch 1                      HS w/ heel propped on pool bench 20 sec x 2  Stretch 2                      Piriformis 20 sec x 2 sitting  Stretch 3                      Calf 30 sec x 2 - *deferred  Stretch Other 1           Wall 30 sec x 2  Stretch Other 2           -  Hip sweeps                 15x ea w/ SN under flexed knee, comfortable range (smaller ROM on L vs R)  Vertical Traction          LN/rail 2 x 2-3 min, chair position  UTR w/ noodle            10x to ea side  Abdominals                 LN x 15  Clams                         15, seated  Hip Abd/Add                15x ea  Hip Ext                        15x, tighten thigh/squeeze gluts  March in Place            5x, stopped 2/2 beginning to bother L hip - *deferred  Mini Squat                   15x  Toe/Heel Raises         15 / 15 - *deferred  Hip circles cw/ccw 10/10  Uni-Squat                    -  Uni-Clock                    -  Step Ups                     -  Bicycle                         1 min, seated, comfortable ROM  Flutter/Scissor             - / 15, comfortable ROM  Exercise 1                   Seated alt LAQ w/ ankle DF x 12 ea  Exercise 2                   At paddle rows x 15 ea arm (closed paddles)  Exercise 3                   Paddle stirs x 15 ea direction (closed paddles  Exercise  4                  UE alt flex/ext x 12 ea  Exercise 5                  UE Hz Abd x 12  Exercise 6                   -  Ended session sitting at jets in therapy pool x 5 min       Assessment & Plan     Assessment    Assessment details: Patient reports pain 4/10 and states her R foot is still bothering her.  Continued with most previous aquatic ex/activity for mobility, flexibility, and strength/stabilization.  Omission of calf stretch and heel raises again today 2/2 foot pain/soreness.  Increased reps on a few ex and tried hip circles this visit.  She performed L hip sweeps in smaller ROM but was able to complete 15 reps this date.  She kept circles small with hip circles and was able to complete without increased pain during ex. Cuing and demonstration provided throughout session for optimal posture, core/glut activation, and correct form/technique with ex/activity.    Plan:  Continue with aquatic ex/activity progressing as appropriate/tolerated.  May consider adding uni-clock, tuck ups and or step ups in future sessions.                 Timed:  Aquatic Therapy    44     mins [id number];    Rula Marcos PT  Physical Therapist    [place] License: [id number]

## 2022-04-22 ENCOUNTER — TREATMENT (OUTPATIENT)
Dept: PHYSICAL THERAPY | Facility: CLINIC | Age: 77
End: 2022-04-22

## 2022-04-22 DIAGNOSIS — M54.50 LUMBAR PAIN WITH RADIATION DOWN LEFT LEG: Primary | ICD-10-CM

## 2022-04-22 DIAGNOSIS — M25.552 LEFT HIP PAIN: ICD-10-CM

## 2022-04-22 DIAGNOSIS — M79.605 LUMBAR PAIN WITH RADIATION DOWN LEFT LEG: Primary | ICD-10-CM

## 2022-04-22 PROCEDURE — 97113 AQUATIC THERAPY/EXERCISES: CPT | Performed by: PHYSICAL THERAPIST

## 2022-04-22 NOTE — PROGRESS NOTES
Physical Therapy Daily Progress Note    Patient: Debo Blair   : 1945  Diagnosis/ICD-10 Code:  Lumbar pain with radiation down left leg [M54.50, M79.605]  Referring practitioner: Demetris Roper MD  Date of Initial Visit: Type: THERAPY  Noted: 3/2/2022  Today's Date: 2022  Patient seen for 11 sessions             Subjective Evaluation    History of Present Illness    Subjective comment: L hip/leg more bothersome after getting in/out of car 3 times this morning.  I'd put L hip/leg pain at 5/10.  I can only do 2 chores/tasks before it starts acting up on me and this morning I was trying to get 3 things done.         Objective         AQUATIC EX:     Water Walk                 Forwards, sideways, backward x 2 laps  Stretch 1                      HS w/ heel propped on pool bench 20 sec x 2  Stretch 2                      Piriformis 20 sec x 2 sitting  Stretch 3                      Calf 30 sec x 2 - *deferred  Stretch Other 1           Wall 30 sec x 2  Stretch Other 2           -  Hip sweeps                 15x ea w/ SN under flexed knee, comfortable range (smaller ROM on L vs R)  Vertical Traction          LN/rail 2 x 2-3 min, chair position  UTR w/ noodle            10x to ea side  Abdominals                 LN x 15  Clams                         15, suspended w/ LN/rail   Hip Abd/Add                15x ea  Hip Ext                        15x, tighten thigh/squeeze gluts  March in Place            5x, stopped 2/2 beginning to bother L hip - *deferred  Mini Squat                   15x  Toe/Heel Raises         15 / 15 - *deferred  Hip circles cw/ccw       10/10  Uni-Squat                    -  Uni-Clock                    5x ea, rail support  Step Ups                     -  Bicycle                         1 min, seated, comfortable ROM  Flutter/Scissor             - / 15, comfortable ROM  Seated Tuck Ups *Next?  Exercise 1                   Seated alt LAQ w/ ankle DF x 12 ea - *deferred  Exercise  2                   At paddle rows x 15 ea arm (closed paddles)  Exercise 3                   Paddle stirs x 15 ea direction (closed paddles  Exercise 4                  UE alt flex/ext x 15 ea  Exercise 5                  UE Hz Abd x 15  Exercise 6                   -  Ended session sitting at jets in therapy pool x 5 min      Assessment & Plan     Assessment    Assessment details: Patient reports L hip/leg more bothersome this morning 2/2 increase in activity and rates pain 5/10.  Continued with most previous aquatic ex/activity for mobility, flexibility, and strength/stabilization.  Increased reps on a couple of ex, tried suspended clams with LN/rail, and added uni-clock this visit.  She was more limited/ cautious with L hip ROM on uni-clock, hip sweeps and with L rotation on UTR w/ noodle.  Cuing and demonstration provided prn throughout session for optimal posture, core/glut activation, and correct form/technique with ex/activity.    Plan:  Continue with aquatic ex/activity progressing as appropriate/tolerated.  May consider adding STS, leg press, seated tuck ups, and/or step ups in future sessions.                 Timed:  Aquatic Therapy    43     mins [id number];    Rula Marcos PT  Physical Therapist    [place] License: [id number]   yes

## 2022-04-26 ENCOUNTER — TREATMENT (OUTPATIENT)
Dept: PHYSICAL THERAPY | Facility: CLINIC | Age: 77
End: 2022-04-26

## 2022-04-26 DIAGNOSIS — M79.605 LUMBAR PAIN WITH RADIATION DOWN LEFT LEG: Primary | ICD-10-CM

## 2022-04-26 DIAGNOSIS — M25.552 LEFT HIP PAIN: ICD-10-CM

## 2022-04-26 DIAGNOSIS — M54.50 LUMBAR PAIN WITH RADIATION DOWN LEFT LEG: Primary | ICD-10-CM

## 2022-04-26 PROCEDURE — 97113 AQUATIC THERAPY/EXERCISES: CPT | Performed by: PHYSICAL THERAPIST

## 2022-04-26 NOTE — PROGRESS NOTES
Physical Therapy Daily Progress Note    Patient: Debo Blair   : 1945  Diagnosis/ICD-10 Code:  Lumbar pain with radiation down left leg [M54.50, M79.605]  Referring practitioner: Demetris Roper MD  Date of Initial Visit: Type: THERAPY  Noted: 3/2/2022  Today's Date: 2022  Patient seen for 12 sessions             Subjective Evaluation    History of Present Illness    Subjective comment: R foot better but still not quite right.         Objective     AQUATIC EX:     Water Walk                 Forwards, sideways, backward x 2 laps  Stretch 1                      HS w/ heel propped on pool bench 20 sec x 2  Stretch 2                      Piriformis 20 sec x 2 sitting  Stretch 3                      Calf 30 sec x 2   Stretch Other 1           Wall 30 sec x 2  Stretch Other 2           Quad/hip flexor 20 sec x 2   Hip sweeps                 15x ea w/ SN under flexed knee, comfortable range (smaller ROM on L vs R)  Vertical Traction          LN/rail 2 x 2-3 min, chair position  UTR w/ noodle            10x to ea side  Abdominals                 LN x 15  Clams                         15, suspended w/ LN/rail   Hip Abd/Add                15x ea  Hip Ext                        15x, tighten thigh/squeeze gluts  Mini Squat                   15x  Toe/Heel Raises         15 / 15 - *deferred  Hip circles cw/ccw       10/10  Uni-Clock                    5x ea, rail support  Step Ups                     R 10x to bottom pool step, L 5x to short red box (both with 1 UE rail support)  Bicycle                         1 min, seated, comfortable ROM  Flutter/Scissor             - / 15, comfortable ROM  STS from pool bench 5x, no hands  Exercise 1                   Seated alt LAQ w/ ankle DF x 12 ea   Exercise 2                   At paddle rows x 15 ea arm (closed paddles)  Exercise 3                   Paddle stirs x 15 ea direction (closed paddles  Exercise 4                  UE alt flex/ext x 15 ea  Exercise  "5                  UE Hz Abd x 15  Exercise 6                   HS curls x 10 ea  Ended session sitting at jets in therapy pool x 5 min    Assessment & Plan     Assessment    Assessment details: Patient reports L hip/leg doing okay this morning and R foot is better but still not quite right.  Continued with most previous aquatic ex/activity for mobility, flexibility, and strength/stabilization. Began use of printed HEP as guide for aquatic ex/activity.    Added quad/hip flexor stretch, HS curls, step ups, and STS this visit.  She was limited in ability to perform step ups on L LE to small red box in ~ 4' depth 2/2 pain so stopped at 5 reps.  Tried seated tuck ups today but bothersome to L hip so discontinued ex.  She was able to do calf stretch today which \"felt good\" but not ready to do heel/toe raises on R LE 2/2 foot pain/discomfort when attempted.  She demonstrates increasing independence with aquatic HEP using printed oipy of ex.  Minimal cuing and demonstration provided prn throughout session for optimal posture, core/glut activation, and correct form/technique with ex/activity.    Plan:  Continue for one more aquatic session to finalize HEP with plan for discharge to independent self care at that time.                 Timed:  Aquatic Therapy    54     mins [id number];    Rula Marcos PT  Physical Therapist    [place] License: [id number]  "

## 2022-04-29 ENCOUNTER — TREATMENT (OUTPATIENT)
Dept: PHYSICAL THERAPY | Facility: CLINIC | Age: 77
End: 2022-04-29

## 2022-04-29 DIAGNOSIS — M25.552 LEFT HIP PAIN: ICD-10-CM

## 2022-04-29 DIAGNOSIS — M79.605 LUMBAR PAIN WITH RADIATION DOWN LEFT LEG: Primary | ICD-10-CM

## 2022-04-29 DIAGNOSIS — M54.50 LUMBAR PAIN WITH RADIATION DOWN LEFT LEG: Primary | ICD-10-CM

## 2022-04-29 PROCEDURE — 97113 AQUATIC THERAPY/EXERCISES: CPT | Performed by: PHYSICAL THERAPIST

## 2022-04-29 NOTE — PROGRESS NOTES
Physical Therapy Daily Progress Note    Patient: Debo Blair   : 1945  Diagnosis/ICD-10 Code:  Lumbar pain with radiation down left leg [M54.50, M79.605]  Referring practitioner: Demetris Roper MD  Date of Initial Visit: Type: THERAPY  Noted: 3/2/2022  Today's Date: 2022  Patient seen for 13 sessions             Subjective Evaluation    History of Present Illness    Subjective comment: I can feel the rain coming in (in my joints).  I got some CBD drops and tried it yesterday - took the edge off my anxiety and I slept really good last night.  Think I'm going to try the 3 month rehab membership       Objective     AQUATIC EX:     Water Walk                 Forwards, sideways, backward x 2 laps  Stretch 1                      HS w/ heel propped on pool bench 20 sec x 2  Stretch 2                      Piriformis 20 sec x 2 sitting  Stretch 3                      Calf 30 sec x 2   Stretch Other 1           Wall 30 sec x 2  Stretch Other 2           Quad/hip flexor 20 sec x 2   Hip sweeps                 15x ea w/ SN under flexed knee, comfortable range (smaller ROM on L vs R)  Vertical Traction          LN/rail 2 x 2-3 min, chair position - on own  UTR w/ noodle            10x to ea side  Abdominals                 LN x 15  Clams                         15, suspended w/ LN/rail   Hip Abd/Add                15x ea  Hip Ext                        15x, tighten thigh/squeeze gluts  Mini Squat                   15x  Toe/Heel Raises         15 / 15 - *deferred  Hip circles cw/ccw       10/10  Uni-Clock                    5x ea, rail support  Step Ups                     R 10x to bottom pool step, L 5x to short red box (both with 1 UE rail support) - *deferred  Bicycle                         1 min, seated, comfortable ROM  Flutter/Scissor             - / 15, comfortable ROM  STS from pool bench  5x, no hands - *deferred  Exercise 1                   Seated alt LAQ w/ ankle DF x 12 ea   Exercise  2                   At paddle rows x 15 ea arm (closed paddles)  Exercise 3                   Paddle stirs x 15 ea direction (closed paddles  Exercise 4                  UE alt flex/ext x 15 ea  Exercise 5                  UE Hz Abd x 15  Exercise 6                   HS curls x 10 ea  Ended session sitting at jets in therapy pool x 5 min       Assessment & Plan     Assessment    Assessment details: Patient notes feeling in her joints with the rain coming in.  She performed most previous aquatic ex/activity for mobility, flexibility, and strength/stabilization using copy of printed HEP as guide with minimal cuing.  She opted to defer a few ex today 2/2 to continued R foot pain (noted R mid foot wrapped with bandage).  Instructed patient to listen to her body and keep ex performance in comfortable range, modify, or omit ex as necessary based on how she's doing at the moment.  She is relatively independent with current aquatic HEP and is agreeable with plan for discharge with HEP for independent self care.      Plan:  Discharged to independent self care.  Patient voices intent to join facility for 3 month rehab membership.    Goals  Plan Goals: Date to achieve STGs:  04/12/22  STG 1 Patient will perform aquatic therapy exercises for lumbar and hip ROM/flexibility, strength and conditioning without increased pain.  Met, feels better for rest of day  STG 2 Patient will demonstrate good postural awareness with standing and walking unsupported in pool for 5 minutes.  Met  STG 3 Patient will report decreased pain following aquatic therapy session by 1 point on VAS.  Met   STG 4 Patient will increase B hip strength to at least 4-/5 to improve stability with walking. Partially Met, some improvement in strength but remains weak in L>R hip flexor, gluts. States L hip hasn't felt like it was going to give out on for quite a while.  Date to achieve LTGs:  05/31/22  LTG 1 Patient will demonstrate an independent aquatic HEP for  lumbar and hip strength, ROM/flexibility, conditioning and balance with community resources.  Met, patient able to use printout of aquatic HEP as guide for ex and voices intent to join facility (3 month membership)  LTG 2 Patient will demonstrate increased core and hip strength and balance with dynamic movements.  Partially Met, hip/core strength have improved, continued balance deficits with dynamic mobility   LTG 3 Patient will report decreased functional disability on Modified Oswestry score from 62 % to 52 % or less.  Met, Oswestry score = 38% (when last assessed)  LTG 4 Patient will report decreased incidence of left hip feels unstable by at least 25%.  Met  LTG 5 Patient will increase B hip strength to at least 4/5 to improve stability with walking.  Unmet, L hip remains weaker than R   STG 6 Patient will report decreased pain to 5/10 or less with ADLs.  Partially Met, variable pain reported but </=5/10 most of the time (w/ rest / activity modification).  LTG 7 Patient will report increased ease with car transfer by 25%.  Partially Met, car transfers are a little better if using hip/knee supports, back brace and doesn't have to get in/out of car multiple times in a short period of time.                 Timed:  Aquatic Therapy    50     mins [id number];    Rula Marcos PT  Physical Therapist    [place] License: [id number]

## 2022-09-29 ENCOUNTER — TELEPHONE (OUTPATIENT)
Dept: GASTROENTEROLOGY | Facility: CLINIC | Age: 77
End: 2022-09-29

## 2022-09-29 NOTE — TELEPHONE ENCOUNTER
Recommend getting RectiCare ointment and applying it as needed and prior to a bowel movement.  This is a numbing cream that should help with the pain.  Also recommend stool softener daily

## 2022-09-29 NOTE — TELEPHONE ENCOUNTER
Called pt and pt reports that this am when she wiped after bm , she had a long stream of bright red blood coming from the right side of the rectum. Pt is asking if she is having a rectal tear.    Pt does report a hx of hemorrhoids.  Pt denies any straining or constipation.   Pt is on med for vaginal dryness.  Pt reports that she took a mirror and looked at her bottom and it was red.  Pt denies a fever and chills. Pt reports that her rectum is very painful.   She is asking what can she do or take till her appt on 10/4 . Advised will send message to Dr Becerra.    Verb understanding.

## 2022-09-29 NOTE — TELEPHONE ENCOUNTER
Hub staff attempted to follow warm transfer process and was unsuccessful     Caller: Debo Blair    Relationship to patient: Self    Best call back number: [phone]    Patient is needing: ADVICE ON HOW TO HELP BECAUSE PT THINKS HAVE RECTAL TEAR - PAIN INSIDE THE RECTUM AREA. WHEN WIPED HAD LONG STREAM OF BRIGHT RED BLOOD AFTER BOWEL MOVEMENT.     SCHEDULED APPOINTMENT 10/4 1:30P WITH [facility];     IF POSSIBLE WOULD LIKE TO BE SEEN EARLIER.  HAS HIP REPLACEMENT SCHEDULED 10/17.

## 2022-10-03 ENCOUNTER — APPOINTMENT (OUTPATIENT)
Dept: PREADMISSION TESTING | Facility: HOSPITAL | Age: 77
End: 2022-10-03

## 2022-10-04 ENCOUNTER — OFFICE VISIT (OUTPATIENT)
Dept: GASTROENTEROLOGY | Facility: CLINIC | Age: 77
End: 2022-10-04

## 2022-10-04 ENCOUNTER — TELEPHONE (OUTPATIENT)
Dept: GASTROENTEROLOGY | Facility: CLINIC | Age: 77
End: 2022-10-04

## 2022-10-04 VITALS
SYSTOLIC BLOOD PRESSURE: 115 MMHG | TEMPERATURE: 96.8 F | HEIGHT: 67 IN | BODY MASS INDEX: 23.51 KG/M2 | DIASTOLIC BLOOD PRESSURE: 77 MMHG | WEIGHT: 149.8 LBS

## 2022-10-04 DIAGNOSIS — K64.4 EXTERNAL HEMORRHOIDS: ICD-10-CM

## 2022-10-04 DIAGNOSIS — K60.2 ANAL FISSURE: Primary | ICD-10-CM

## 2022-10-04 PROCEDURE — 99214 OFFICE O/P EST MOD 30 MIN: CPT | Performed by: PHYSICIAN ASSISTANT

## 2022-10-04 RX ORDER — ESTRADIOL 10 UG/1
1 INSERT VAGINAL 2 TIMES WEEKLY
COMMUNITY
End: 2022-11-16

## 2022-10-04 RX ORDER — ESTRADIOL 0.1 MG/G
2 CREAM VAGINAL DAILY
COMMUNITY
End: 2022-11-16

## 2022-10-04 NOTE — PROGRESS NOTES
Chief Complaint  anal leakage, Rectal Pain, and Irritable Bowel Syndrome    Subjective        History of Present Illness  Debo Blair is a  77 y.o. female here for complaints of rectal pain, IBS and equal smearing.    She reports a few weeks ago she had a bowel movement which was followed by her some pain and bright red blood on the toilet tissue.  Her bowels are moving daily but not complete evacuation.  She reports trying RectiCare ointment which resulted in burning sensation    Colonoscopy 6/2021 with evidence of prior end-to-end colocolonic anastomosis within the sigmoid colon which was patent and characterized by healthy-appearing mucosa, small hyperplastic polyps and 1 adenomatous polyp removed, few descending colonic diverticulosis and nonbleeding internal hemorrhoids.  Recall 5 years..    Cancelled and rescheduled left hip replacement for 1/19/2023.    Past Medical History:   Diagnosis Date   • Anxiety    • Arthritis    • Colon polyp     Refer to notes of Dr. Becerra   • Coronary artery disease 08/09/2022   • Diverticulitis of colon     Diverticulosis   • Diverticulosis    • Fibrocystic breast changes 1971   • GERD (gastroesophageal reflux disease)    • History of colon polyps    • Hypertension    • IBS (irritable bowel syndrome)    • Lactose intolerance    • Melanosis coli    • Osteoarthritis    • Osteoporosis    • Paralytic ileus (HCC) 1975       Past Surgical History:   Procedure Laterality Date   • ABDOMINAL SURGERY     • ANAL FISSURECTOMY  2004   • CHOLECYSTECTOMY     • COLON RESECTION N/A 03/21/2016    Procedure: COLON RESECTION LAPAROSCOPIC LEFT MINDY COLECTOMY ;  Surgeon: Nikki Smith MD;  Location: [facility];  Service:    • COLON RESECTION     • COLONOSCOPY  01/18/2016    polyps, NBIH, tubular adenoma w/low grade dysplasia   • COLONOSCOPY N/A 08/22/2018    Procedure: COLONOSCOPY TO CECUM AND INTO TERMINAL ILEUM WITH HOT SNARE POLYPECTOMY,  5 ML SALINE LIFT INJECTION, 1 ML TATTOO INK  INJECTION, RESOLUTION CLIP X1 PLACEMENT, AND APC CAUTERY TO TRANSVERSE COLON POLYP SITE;  Surgeon: Luna Becerra MD;  Location:  [facility];  Service: Gastroenterology   • COLONOSCOPY N/A 2021    Procedure: COLONOSCOPY to anastamosis and cecum with cold/hot snare polypectomies;  Surgeon: Luna Becerra MD;  Location:  [facility];  Service: Gastroenterology;  Laterality: N/A;  Pre: h/x of colon polyps  Post: diverticulosis, polyps, left sided anastamosis, hemorrhoids   • HYSTERECTOMY     • SKIN TAG REMOVAL         Family History   Problem Relation Age of Onset   • Diabetes Mother    • Hypertension Mother    • Kidney disease Mother    • Heart disease Father    • Arthritis Father    • Coarctation of the aorta Father    • Heart valve disorder Brother    • Colon polyps Daughter    • Seizures Son    • Colon cancer Maternal Grandmother    • Colon polyps Maternal Grandmother        Social History     Socioeconomic History   • Marital status: Single   Tobacco Use   • Smoking status: Former Smoker     Packs/day: 1.00     Years: 40.00     Pack years: 40.00     Start date:      Quit date:      Years since quittin.7   • Smokeless tobacco: Former User     Quit date: 3/2/2016   Substance and Sexual Activity   • Alcohol use: Not Currently     Alcohol/week: 1.0 standard drink     Types: 1 Glasses of wine per week     Comment: not current   • Drug use: No   • Sexual activity: Defer       Allergies   Allergen Reactions   • Latex Rash   • Neosporin [Neomycin-Bacitracin Zn-Polymyx] Rash   • Sulfa Antibiotics Nausea And Vomiting   • Meclizine Irritability   • Codeine Nausea And Vomiting   • Gabapentin Swelling     ON LIPS   • Keflex [Cephalexin] Itching   • Pregabalin Other (See Comments)     CONSTIPATION   • Tramadol Palpitations     Heart racing   • Trazodone Palpitations       Current Outpatient Medications on File Prior to Visit   Medication Sig Dispense Refill   • ALPRAZolam (XANAX) 1 MG  tablet Take 0.05 mg by mouth at night as needed.  4   • coenzyme Q10 100 MG capsule Take 100 mg by mouth Daily.     • estradiol (ESTRACE) 0.1 MG/GM vaginal cream Insert 2 g into the vagina Daily.     • estradiol (VAGIFEM) 10 MCG tablet vaginal tablet Insert 1 tablet into the vagina 2 (Two) Times a Week.     • montelukast (SINGULAIR) 10 MG tablet   3   • pravastatin (PRAVACHOL) 10 MG tablet Take 5 mg by mouth Daily.     • Probiotic Product (PROBIOTIC DAILY PO) Take  by mouth.     • simethicone (MYLICON) 125 MG chewable tablet Chew 125 mg Every 6 (Six) Hours As Needed for flatulence.     • SUMAtriptan (IMITREX) 100 MG tablet Take one tablet at onset of headache. May repeat dose one time in 2 hours if headache not relieved.  4   • valsartan (DIOVAN) 80 MG tablet Take 160 mg by mouth every morning. LIKES TO TAKE TWO 80 MG TABS  3   • vitamin D (ERGOCALCIFEROL) 31384 UNITS capsule capsule 50,000 Units every 7 days.  3   • [DISCONTINUED] aspirin 81 MG EC tablet Take 81 mg by mouth Daily.     • [DISCONTINUED] calcium carbonate (TUMS) 500 MG chewable tablet Chew 1 tablet Daily.     • [DISCONTINUED] Codeine Polt-Chlorphen Polt ER (TUZISTRA XR) 14.7-2.8 MG/5ML Suspension Extended Release Take 10 mL by mouth 2 (Two) Times a Day. 180 mL 0   • [DISCONTINUED] KRILL OIL PO Take  by mouth.     • [DISCONTINUED] losartan (COZAAR) 50 MG tablet Take 50 mg by mouth Daily.     • [DISCONTINUED] lubiprostone (AMITIZA) 8 MCG capsule Take 1 capsule by mouth 2 (Two) Times a Day With Meals. 60 capsule 5   • [DISCONTINUED] metoprolol tartrate (LOPRESSOR) 25 MG tablet Take 25 mg by mouth 2 (Two) Times a Day.     • [DISCONTINUED] polyethylene glycol (MIRALAX) packet Take 17 g by mouth Daily.     • [DISCONTINUED] PROAIR  (90 BASE) MCG/ACT inhaler 2 puffs every 4 (four) hours as needed.  1   • [DISCONTINUED] promethazine (PHENERGAN) 12.5 MG tablet Take 1 tablet by mouth every 6 (six) hours as needed for nausea or vomiting. 46 tablet 0     No  "current facility-administered medications on file prior to visit.       Review of Systems   Constitutional: Negative for chills and fever.   HENT: Negative for trouble swallowing.    Respiratory: Negative for cough and shortness of breath.    Cardiovascular: Negative for chest pain and palpitations.   Gastrointestinal: Positive for abdominal pain, anal bleeding and constipation. Negative for diarrhea, nausea and vomiting.        Objective   Vital Signs:   /77   Temp 96.8 °F (36 °C)   Ht 170.2 cm (67\")   Wt 67.9 kg (149 lb 12.8 oz)   BMI 23.46 kg/m²       Physical Exam  Vitals and nursing note reviewed.   Constitutional:       General: She is not in acute distress.     Appearance: Normal appearance. She is not ill-appearing.   HENT:      Head: Normocephalic and atraumatic.      Right Ear: External ear normal.      Left Ear: External ear normal.   Eyes:      General: No scleral icterus.     Conjunctiva/sclera: Conjunctivae normal.      Pupils: Pupils are equal, round, and reactive to light.   Cardiovascular:      Rate and Rhythm: Normal rate and regular rhythm.      Heart sounds: Normal heart sounds.   Pulmonary:      Effort: Pulmonary effort is normal.      Breath sounds: Normal breath sounds.   Abdominal:      General: Abdomen is flat. Bowel sounds are normal. There is no distension.      Palpations: Abdomen is soft.      Tenderness: There is no abdominal tenderness. There is no guarding or rebound.   Genitourinary:     Comments: External hemorrhoids and anal fissure on physical exam  Musculoskeletal:      Cervical back: Normal range of motion and neck supple.   Skin:     General: Skin is warm and dry.   Neurological:      Mental Status: She is alert and oriented to person, place, and time.   Psychiatric:         Mood and Affect: Mood normal.         Behavior: Behavior normal.          Result Review :                             Assessment and Plan    Diagnoses and all orders for this visit:    1. Anal " fissure (Primary)    2. External hemorrhoids      · Bowels are moving daily but not necessarily complete bleeding.  Recommend adding a half capful of MiraLAX daily.  We discussed titration up or down based on bowel habits.  She has previously tried Metamucil in the past which she found constipating and does not wish to try again.  · Prescription submitted for compound ointment to apply to fissure 2-3 times a day.  · Commend sitz bath's 2-3 times a day.    · Follow-up in 1 month, sooner if necessary.       Follow Up   Return in about 4 weeks (around 11/1/2022).    Dragon dictation used throughout this note.     NIKITA Pulliam

## 2022-10-04 NOTE — TELEPHONE ENCOUNTER
Script for [facility] pharmacy faxed to them at [phone] and fax confirmation received and scanned under media tab. Update sent to Shannon SHELTON.

## 2022-10-07 ENCOUNTER — APPOINTMENT (OUTPATIENT)
Dept: PREADMISSION TESTING | Facility: HOSPITAL | Age: 77
End: 2022-10-07

## 2022-10-07 DIAGNOSIS — R19.5 DECREASED STOOL CALIBER: ICD-10-CM

## 2022-10-07 DIAGNOSIS — R10.32 LEFT LOWER QUADRANT PAIN: ICD-10-CM

## 2022-10-07 DIAGNOSIS — Z86.010 HISTORY OF ADENOMATOUS POLYP OF COLON: Primary | ICD-10-CM

## 2022-10-07 DIAGNOSIS — Z90.49 HISTORY OF LEFT HEMICOLECTOMY: ICD-10-CM

## 2022-10-17 ENCOUNTER — TELEPHONE (OUTPATIENT)
Dept: GASTROENTEROLOGY | Facility: CLINIC | Age: 77
End: 2022-10-17

## 2022-10-17 NOTE — TELEPHONE ENCOUNTER
The fissure is actually rather small which should not be causing a problem with passage of stool. Instead of the MiraLAX she could try some stool softeners and fiber supplementation in the form of Metamucil, FiberCon, Citrucel, Benefiber, etc.

## 2022-10-17 NOTE — TELEPHONE ENCOUNTER
----- Message from Debo Blair sent at 10/17/2022 12:52 PM EDT -----  Regarding: Left lower bowel quadrant hemorrhoids and right lower quadrant gas pains  Contact: [phone]  I took a capful of Miralax last night and had diarrhea twice. The LLQ has hemorrhoids that the stool has to pass is tricky. I took capful last night because the stools the morning before were expelled like 90 degree stool shapes. I drink the water but the water goes out as fast as it goes in.  How large is this fissure?

## 2022-10-18 ENCOUNTER — TELEPHONE (OUTPATIENT)
Dept: GASTROENTEROLOGY | Facility: CLINIC | Age: 77
End: 2022-10-18

## 2022-10-18 NOTE — TELEPHONE ENCOUNTER
vm left for pt to call back    Pt will need to schedule fu sooner with marco antonio aleman for the hub to schedule

## 2022-10-18 NOTE — TELEPHONE ENCOUNTER
Hub staff attempted to follow warm transfer process and was unsuccessful     Caller: Debo Blair    Relationship to patient: Self    Best call back number: [phone]    Patient is needing: RETURNING MISSED CALL. WILL BE AVAILABLE TOMORROW BY NOON.

## 2022-10-19 ENCOUNTER — TELEPHONE (OUTPATIENT)
Dept: GASTROENTEROLOGY | Facility: CLINIC | Age: 77
End: 2022-10-19

## 2022-10-19 NOTE — TELEPHONE ENCOUNTER
----- Message from Debo Blair sent at 10/19/2022  8:16 AM EDT -----  Regarding: Left lower bowel quadrant hemorrhoids and right lower quadrant gas pains  Contact: [phone]  My follow up is scheduled for November 4  So what’s next?

## 2022-10-19 NOTE — TELEPHONE ENCOUNTER
Hub staff attempted to follow warm transfer process and was unsuccessful     Caller: Debo Blair    Relationship to patient: Self    Best call back number: [phone]    Patient is needing: RETURNING MISSED CALL TO [facility]. FIRST AVAILABLE

## 2022-10-28 ENCOUNTER — TELEPHONE (OUTPATIENT)
Dept: GASTROENTEROLOGY | Facility: CLINIC | Age: 77
End: 2022-10-28

## 2022-10-28 NOTE — TELEPHONE ENCOUNTER
----- Message from Debo Blair sent at 10/28/2022 11:24 AM EDT -----  Regarding: November 4 Follow Up  Contact: [phone]  I’ll come to have you check it out but please be very careful. Thanks

## 2022-10-28 NOTE — TELEPHONE ENCOUNTER
Per Shannon SHELTON pt can delay appt to allow for healing. Called pt and advised of the above.   Pt verb understanding and appt moved to 11/16 at 1p with Shannon SHELTON.    Update sent to Shannon SHELTON.

## 2022-11-16 ENCOUNTER — OFFICE VISIT (OUTPATIENT)
Dept: GASTROENTEROLOGY | Facility: CLINIC | Age: 77
End: 2022-11-16

## 2022-11-16 VITALS
TEMPERATURE: 96.7 F | DIASTOLIC BLOOD PRESSURE: 80 MMHG | HEART RATE: 88 BPM | HEIGHT: 67 IN | WEIGHT: 142.7 LBS | SYSTOLIC BLOOD PRESSURE: 134 MMHG | BODY MASS INDEX: 22.4 KG/M2

## 2022-11-16 DIAGNOSIS — K64.4 EXTERNAL HEMORRHOID: Primary | ICD-10-CM

## 2022-11-16 DIAGNOSIS — Z87.19 HISTORY OF ANAL FISSURES: ICD-10-CM

## 2022-11-16 PROCEDURE — 99214 OFFICE O/P EST MOD 30 MIN: CPT | Performed by: PHYSICIAN ASSISTANT

## 2022-11-16 RX ORDER — CLOBETASOL PROPIONATE 0.5 MG/G
CREAM TOPICAL
COMMUNITY
Start: 2022-10-21 | End: 2023-03-06

## 2022-11-16 RX ORDER — CLINDAMYCIN HYDROCHLORIDE 150 MG/1
CAPSULE ORAL
COMMUNITY
Start: 2022-11-14 | End: 2023-03-06

## 2022-11-16 NOTE — PROGRESS NOTES
Chief Complaint  Anal Fissure    Subjective        History of Present Illness  Debo Blair is a  77 y.o. female here for follow-up for hemorrhoids and anal fissure.    She presents today with resolution of the bulge at the anal opening.  She is no longer having pain with defecation. No further blood per rectum.  She does continue to do 2 sitz bath's daily.  She has been off of her MiraLAX for 2 days given the fact that she was placed on clindamycin for an oral infection and she has had no difficulties with bowel movements.  She reports she is awaiting hip replacement.      She recently bought a hemorrhoidal salve off of [employer] which she has found soothing which has a beeswax base.    Recently seen by the gynecologist with vulvar biopsy consistent with lichen sclerosis per patient    Colonoscopy 6/2021 with evidence of patent end-to-end colo-colonic anastomosis, small hyperplastic polyps and 1 adenomatous polyp removed, descending colonic diverticulosis and nonbleeding internal hemorrhoids.  Recall 5 years.    Past Medical History:   Diagnosis Date   • Anxiety    • Arthritis    • Colon polyp     Refer to notes of Dr. Becerra   • Coronary artery disease 08/09/2022   • Diverticulitis of colon     Diverticulosis   • Diverticulosis    • Fibrocystic breast changes 1971   • GERD (gastroesophageal reflux disease)    • History of colon polyps    • Hyperlipidemia    • Hypertension    • IBS (irritable bowel syndrome)    • Lactose intolerance    • Melanosis coli    • Osteoarthritis    • Osteoporosis    • Paralytic ileus (HCC) 1975       Past Surgical History:   Procedure Laterality Date   • ABDOMINAL SURGERY     • ANAL FISSURECTOMY  2004   • CHOLECYSTECTOMY     • COLON RESECTION N/A 03/21/2016    Procedure: COLON RESECTION LAPAROSCOPIC LEFT MINDY COLECTOMY ;  Surgeon: Nikki Smith MD;  Location: [facility];  Service:    • COLON RESECTION     • COLONOSCOPY  01/18/2016    polyps, NBIH, tubular adenoma w/low grade  dysplasia   • COLONOSCOPY N/A 2018    Procedure: COLONOSCOPY TO CECUM AND INTO TERMINAL ILEUM WITH HOT SNARE POLYPECTOMY,  5 ML SALINE LIFT INJECTION, 1 ML TATTOO INK INJECTION, RESOLUTION CLIP X1 PLACEMENT, AND APC CAUTERY TO TRANSVERSE COLON POLYP SITE;  Surgeon: Luna Becerra MD;  Location: [facility] ENDOSCOPY;  Service: Gastroenterology   • COLONOSCOPY N/A 2021    Procedure: COLONOSCOPY to anastamosis and cecum with cold/hot snare polypectomies;  Surgeon: Luna Becerra MD;  Location: [facility] ENDOSCOPY;  Service: Gastroenterology;  Laterality: N/A;  Pre: h/x of colon polyps  Post: diverticulosis, polyps, left sided anastamosis, hemorrhoids   • HYSTERECTOMY     • SKIN TAG REMOVAL         Family History   Problem Relation Age of Onset   • Diabetes Mother    • Hypertension Mother    • Kidney disease Mother    • Heart disease Father    • Arthritis Father    • Coarctation of the aorta Father    • Heart valve disorder Brother    • Colon polyps Daughter    • Seizures Son    • Colon cancer Maternal Grandmother    • Colon polyps Maternal Grandmother        Social History     Socioeconomic History   • Marital status: Single   Tobacco Use   • Smoking status: Former     Packs/day: 1.00     Years: 40.00     Pack years: 40.00     Types: Cigarettes     Start date: 1970     Quit date: 2015     Years since quittin.8   • Smokeless tobacco: Former     Quit date: 3/2/2016   Substance and Sexual Activity   • Alcohol use: Not Currently     Alcohol/week: 1.0 standard drink     Types: 1 Glasses of wine per week     Comment: not current   • Drug use: No   • Sexual activity: Not Currently     Partners: Male     Birth control/protection: Condom       Allergies   Allergen Reactions   • Latex Rash   • Neosporin [Neomycin-Bacitracin Zn-Polymyx] Rash   • Sulfa Antibiotics Nausea And Vomiting   • Meclizine Irritability   • Codeine Nausea And Vomiting   • Gabapentin Swelling     ON LIPS   • Keflex [Cephalexin]  "Itching   • Pregabalin Other (See Comments)     CONSTIPATION   • Tramadol Palpitations     Heart racing   • Trazodone Palpitations       Current Outpatient Medications on File Prior to Visit   Medication Sig Dispense Refill   • ALPRAZolam (XANAX) 1 MG tablet Take 0.05 mg by mouth at night as needed.  4   • clindamycin (CLEOCIN) 150 MG capsule      • clobetasol (TEMOVATE) 0.05 % cream      • coenzyme Q10 100 MG capsule Take 100 mg by mouth Daily.     • montelukast (SINGULAIR) 10 MG tablet   3   • pravastatin (PRAVACHOL) 10 MG tablet Take 5 mg by mouth Daily.     • Probiotic Product (PROBIOTIC DAILY PO) Take  by mouth.     • simethicone (MYLICON) 125 MG chewable tablet Chew 125 mg Every 6 (Six) Hours As Needed for flatulence.     • SUMAtriptan (IMITREX) 100 MG tablet Take one tablet at onset of headache. May repeat dose one time in 2 hours if headache not relieved.  4   • valsartan (DIOVAN) 80 MG tablet Take 160 mg by mouth every morning. LIKES TO TAKE TWO 80 MG TABS  3   • vitamin D (ERGOCALCIFEROL) 57891 UNITS capsule capsule 50,000 Units every 7 days.  3   • [DISCONTINUED] estradiol (ESTRACE) 0.1 MG/GM vaginal cream Insert 2 g into the vagina Daily.     • [DISCONTINUED] estradiol (VAGIFEM) 10 MCG tablet vaginal tablet Insert 1 tablet into the vagina 2 (Two) Times a Week.       No current facility-administered medications on file prior to visit.       Review of Systems     Objective   Vital Signs:   /80   Pulse 88   Temp 96.7 °F (35.9 °C)   Ht 170.2 cm (67\")   Wt 64.7 kg (142 lb 11.2 oz)   BMI 22.35 kg/m²       Physical Exam  Vitals and nursing note reviewed.   Constitutional:       General: She is not in acute distress.     Appearance: Normal appearance. She is not ill-appearing.   HENT:      Head: Normocephalic and atraumatic.      Right Ear: External ear normal.      Left Ear: External ear normal.   Eyes:      General: No scleral icterus.     Conjunctiva/sclera: Conjunctivae normal.      Pupils: Pupils " are equal, round, and reactive to light.   Pulmonary:      Effort: Pulmonary effort is normal.   Genitourinary:     Comments: Resolution of fissure.  External skin tags with small residual hemorrhoid.   Musculoskeletal:      Cervical back: Normal range of motion and neck supple.   Skin:     General: Skin is warm and dry.   Neurological:      Mental Status: She is alert and oriented to person, place, and time.   Psychiatric:         Mood and Affect: Mood normal.         Behavior: Behavior normal.          Result Review :                             Assessment and Plan    Diagnoses and all orders for this visit:    1. External hemorrhoid (Primary)    2. History of anal fissures      · Okay to hold MiraLAX while taking the clindamycin.  We did discuss that once clindamycin is completed recommend restarting MiraLAX to prevent constipation.  · Continue adequate water intake.  · Continue high-fiber diet, consider addition of fiber supplementation  · No objections to using hemorrhoidal salve as needed as it does not contain any steroids.  · Recall colonoscopy 6/2025  · Follow up in 6  Months, sooner if necessary      Follow Up   Return in about 6 months (around 5/16/2023).    I spent 30 minutes caring for Debo Blair on this date of service. This time includes time spent by me in the following activities: preparing for the visit, reviewing tests, obtaining and/or reviewing a separately obtained history, performing a medically appropriate examination and/or evaluation , counseling and educating the patient/family/caregiver, ordering medications, tests, or procedures, documenting information in the medical record, independently interpreting results and communicating that information with the patient/family/caregiver and care coordination.  Dragon dictation used throughout this note.     NIKITA Pulliam

## 2022-12-05 ENCOUNTER — TELEPHONE (OUTPATIENT)
Dept: GASTROENTEROLOGY | Facility: CLINIC | Age: 77
End: 2022-12-05

## 2022-12-05 NOTE — TELEPHONE ENCOUNTER
----- Message from FAROOQ Brooks sent at 12/2/2022  3:32 PM EST -----  Regarding: FW: Hemorrhoids and elimination  Contact: [phone]  Stool quantity can vary based on diet.    ----- Message -----  From: Lilly Najera RN  Sent: 12/1/2022   9:38 AM EST  To: NIKITA Pulliam  Subject: FW: Hemorrhoids and elimination                    ----- Message -----  From: Debo Blair  Sent: 12/1/2022   7:22 AM EST  To: [facility]  Subject: Hemorrhoids and elimination                      Example this morning- my large intestine evacuates approximately 2 ft of stool in a way it’s good because then my hemorrhoids don’t feel pressure but I don’t think I eat that much. Is this “normal” since I have diverticulosis and I guess a slow colon???

## 2023-03-06 ENCOUNTER — HOSPITAL ENCOUNTER (OUTPATIENT)
Dept: GENERAL RADIOLOGY | Facility: HOSPITAL | Age: 78
Discharge: HOME | End: 2023-03-06
Payer: MEDICARE

## 2023-03-06 ENCOUNTER — PRE-ADMISSION TESTING (OUTPATIENT)
Dept: PREADMISSION TESTING | Facility: HOSPITAL | Age: 78
End: 2023-03-06
Payer: MEDICARE

## 2023-03-06 VITALS
HEART RATE: 74 BPM | WEIGHT: 138.1 LBS | SYSTOLIC BLOOD PRESSURE: 118 MMHG | DIASTOLIC BLOOD PRESSURE: 80 MMHG | OXYGEN SATURATION: 97 % | BODY MASS INDEX: 21.67 KG/M2 | HEIGHT: 67 IN | RESPIRATION RATE: 16 BRPM | TEMPERATURE: 97 F

## 2023-03-06 LAB
ABO GROUP BLD: NORMAL
ALBUMIN SERPL-MCNC: 4.2 G/DL (ref 3.5–5.2)
ALBUMIN/GLOB SERPL: 1.4 G/DL
ALP SERPL-CCNC: 100 U/L (ref 39–117)
ALT SERPL W P-5'-P-CCNC: 13 U/L (ref 1–33)
ANION GAP SERPL CALCULATED.3IONS-SCNC: 7 MMOL/L (ref 5–15)
AST SERPL-CCNC: 15 U/L (ref 1–32)
BACTERIA UR QL AUTO: ABNORMAL /HPF
BILIRUB SERPL-MCNC: 0.2 MG/DL (ref 0–1.2)
BILIRUB UR QL STRIP: NEGATIVE
BLD GP AB SCN SERPL QL: NEGATIVE
BUN SERPL-MCNC: 12 MG/DL (ref 8–23)
BUN/CREAT SERPL: 14.6 (ref 7–25)
CALCIUM SPEC-SCNC: 9.4 MG/DL (ref 8.6–10.5)
CHLORIDE SERPL-SCNC: 106 MMOL/L (ref 98–107)
CLARITY UR: CLEAR
CO2 SERPL-SCNC: 28 MMOL/L (ref 22–29)
COLOR UR: YELLOW
CREAT SERPL-MCNC: 0.82 MG/DL (ref 0.57–1)
DEPRECATED RDW RBC AUTO: 40.5 FL (ref 37–54)
EGFRCR SERPLBLD CKD-EPI 2021: 74.7 ML/MIN/1.73
ERYTHROCYTE [DISTWIDTH] IN BLOOD BY AUTOMATED COUNT: 11.8 % (ref 12.3–15.4)
GLOBULIN UR ELPH-MCNC: 2.9 GM/DL
GLUCOSE SERPL-MCNC: 110 MG/DL (ref 65–99)
GLUCOSE UR STRIP-MCNC: NEGATIVE MG/DL
HBA1C MFR BLD: 5.5 % (ref 4.8–5.6)
HCT VFR BLD AUTO: 42.8 % (ref 34–46.6)
HGB BLD-MCNC: 14 G/DL (ref 12–15.9)
HGB UR QL STRIP.AUTO: NEGATIVE
HYALINE CASTS UR QL AUTO: ABNORMAL /LPF
KETONES UR QL STRIP: NEGATIVE
LEUKOCYTE ESTERASE UR QL STRIP.AUTO: ABNORMAL
MCH RBC QN AUTO: 30.6 PG (ref 26.6–33)
MCHC RBC AUTO-ENTMCNC: 32.7 G/DL (ref 31.5–35.7)
MCV RBC AUTO: 93.7 FL (ref 79–97)
NITRITE UR QL STRIP: NEGATIVE
PH UR STRIP.AUTO: 6 [PH] (ref 5–8)
PLATELET # BLD AUTO: 164 10*3/MM3 (ref 140–450)
PMV BLD AUTO: 13.3 FL (ref 6–12)
POTASSIUM SERPL-SCNC: 3.9 MMOL/L (ref 3.5–5.2)
PROT SERPL-MCNC: 7.1 G/DL (ref 6–8.5)
PROT UR QL STRIP: NEGATIVE
RBC # BLD AUTO: 4.57 10*6/MM3 (ref 3.77–5.28)
RBC # UR STRIP: ABNORMAL /HPF
REF LAB TEST METHOD: ABNORMAL
RH BLD: POSITIVE
SODIUM SERPL-SCNC: 141 MMOL/L (ref 136–145)
SP GR UR STRIP: 1.02 (ref 1–1.03)
SQUAMOUS #/AREA URNS HPF: ABNORMAL /HPF
T&S EXPIRATION DATE: NORMAL
UROBILINOGEN UR QL STRIP: ABNORMAL
WBC # UR STRIP: ABNORMAL /HPF
WBC NRBC COR # BLD: 7.82 10*3/MM3 (ref 3.4–10.8)

## 2023-03-06 PROCEDURE — 73502 X-RAY EXAM HIP UNI 2-3 VIEWS: CPT

## 2023-03-06 PROCEDURE — 86850 RBC ANTIBODY SCREEN: CPT

## 2023-03-06 PROCEDURE — 36415 COLL VENOUS BLD VENIPUNCTURE: CPT

## 2023-03-06 PROCEDURE — 83036 HEMOGLOBIN GLYCOSYLATED A1C: CPT

## 2023-03-06 PROCEDURE — 80053 COMPREHEN METABOLIC PANEL: CPT

## 2023-03-06 PROCEDURE — 81001 URINALYSIS AUTO W/SCOPE: CPT

## 2023-03-06 PROCEDURE — 85027 COMPLETE CBC AUTOMATED: CPT

## 2023-03-06 PROCEDURE — 86901 BLOOD TYPING SEROLOGIC RH(D): CPT

## 2023-03-06 PROCEDURE — 71046 X-RAY EXAM CHEST 2 VIEWS: CPT

## 2023-03-06 PROCEDURE — 86900 BLOOD TYPING SEROLOGIC ABO: CPT

## 2023-03-06 RX ORDER — CHLORHEXIDINE GLUCONATE 500 MG/1
CLOTH TOPICAL
COMMUNITY

## 2023-03-06 RX ORDER — POLYETHYLENE GLYCOL 3350 17 G/17G
17 POWDER, FOR SOLUTION ORAL NIGHTLY
COMMUNITY

## 2023-03-06 RX ORDER — ACETAMINOPHEN 500 MG
500 TABLET ORAL EVERY 6 HOURS PRN
COMMUNITY

## 2023-03-06 RX ORDER — VALACYCLOVIR HYDROCHLORIDE 1 G/1
1000 TABLET, FILM COATED ORAL AS NEEDED
COMMUNITY

## 2023-03-06 RX ORDER — HYPOCHLOROUS ACID/SODIUM CHLOR 0.01 %
SPRAY, NON-AEROSOL (ML) TOPICAL NIGHTLY
COMMUNITY

## 2023-03-06 RX ORDER — PRAVASTATIN SODIUM 20 MG
20 TABLET ORAL NIGHTLY
COMMUNITY

## 2023-03-06 RX ORDER — METOPROLOL SUCCINATE 25 MG/1
25 TABLET, EXTENDED RELEASE ORAL EVERY EVENING
COMMUNITY

## 2023-03-06 NOTE — DISCHARGE INSTRUCTIONS
CHLORHEXIDINE CLOTH INSTRUCTIONS  The morning of surgery follow these instructions using the Chlorhexidine cloths you've been given.  These steps reduce bacteria on the body.  Do not use the cloths near your eyes, ears mouth, genitalia or on open wounds.  Throw the cloths away after use but do not try to flush them down a toilet.      Open and remove one cloth at a time from the package.    Leave the cloth unfolded and begin the bathing.  Massage the skin with the cloths using gentle pressure to remove bacteria.  Do not scrub harshly.   Follow the steps below with one 2% CHG cloth per area (6 total cloths).  One cloth for neck, shoulders and chest.  One cloth for both arms, hands, fingers and underarms (do underarms last).  One cloth for the abdomen followed by groin.  One cloth for right leg and foot including between the toes.  One cloth for left leg and foot including between the toes.  The last cloth is to be used for the back of the neck, back and buttocks.    Allow the CHG to air dry 3 minutes on the skin which will give it time to work and decrease the chance of irritation.  The skin may feel sticky until it is dry.  Do not rinse with water or any other liquid or you will lose the beneficial effects of the CHG.  If mild skin irritation occurs, do rinse the skin to remove the CHG.  Report this to the nurse at time of admission.  Do not apply lotions, creams, ointments, deodorants or perfumes after using the clothes. Dress in clean clothes before coming to the hospital.     Take the following medications the morning of surgery:  [facility] TO CALL DAY EBFORE SURGERY WITH ARRIVAL TIME FOR 3/17/23      If you are on prescription narcotic pain medication to control your pain you may also take that medication the morning of surgery.    General Instructions:  Do not eat solid food after midnight the night before surgery.  You may drink clear liquids day of surgery but must stop at least one hour before your  hospital arrival time.  It is beneficial for you to have a clear drink that contains carbohydrates the day of surgery.  We suggest a 12 to 20 ounce bottle of Gatorade or Powerade for non-diabetic patients or a 12 to 20 ounce bottle of G2 or Powerade Zero for diabetic patients. (Pediatric patients, are not advised to drink a 12 to 20 ounce carbohydrate drink)    Clear liquids are liquids you can see through.  Nothing red in color.     Plain water                               Sports drinks  Sodas                                   Gelatin (Jell-O)  Fruit juices without pulp such as white grape juice and apple juice  Popsicles that contain no fruit or yogurt  Tea or coffee (no cream or milk added)  Gatorade / Powerade  G2 / Powerade Zero    Infants may have breast milk up to four hours before surgery.  Infants drinking formula may drink formula up to six hours before surgery.   Patients who avoid smoking, chewing tobacco and alcohol for 4 weeks prior to surgery have a reduced risk of post-operative complications.  Quit smoking as many days before surgery as you can.  Do not smoke, use chewing tobacco or drink alcohol the day of surgery.   If applicable bring your C-PAP/ BI-PAP machine.  Bring any papers given to you in the doctor’s office.  Wear clean comfortable clothes.  Do not wear contact lenses, false eyelashes or make-up.  Bring a case for your glasses.   Bring crutches or walker if applicable.  Remove all piercings.  Leave jewelry and any other valuables at home.  Hair extensions with metal clips must be removed prior to surgery.  The Pre-Admission Testing nurse will instruct you to bring medications if unable to obtain an accurate list in Pre-Admission Testing.        If you were given a blood bank ID arm band remember to bring it with you the day of surgery.    Preventing a Surgical Site Infection:  For 2 to 3 days before surgery, avoid shaving with a razor because the razor can irritate skin and make it  easier to develop an infection.    Any areas of open skin can increase the risk of a post-operative wound infection by allowing bacteria to enter and travel throughout the body.  Notify your surgeon if you have any skin wounds / rashes even if it is not near the expected surgical site.  The area will need assessed to determine if surgery should be delayed until it is healed.  The night prior to surgery shower using a fresh bar of anti-bacterial soap (such as Dial) and clean washcloth.  Sleep in a clean bed with clean clothing.  Do not allow pets to sleep with you.  Shower on the morning of surgery using a fresh bar of anti-bacterial soap (such as Dial) and clean washcloth.  Dry with a clean towel and dress in clean clothing.  Ask your surgeon if you will be receiving antibiotics prior to surgery.  Make sure you, your family, and all healthcare providers clean their hands with soap and water or an alcohol based hand  before caring for you or your wound.    Day of surgery:  Your arrival time is approximately two hours before your scheduled surgery time.  Upon arrival, a Pre-op nurse and Anesthesiologist will review your health history, obtain vital signs, and answer questions you may have.  The only belongings needed at this time will be a list of your home medications and if applicable your C-PAP/BI-PAP machine.  A Pre-op nurse will start an IV and you may receive medication in preparation for surgery, including something to help you relax.     Please be aware that surgery does come with discomfort.  We want to make every effort to control your discomfort so please discuss any uncontrolled symptoms with your nurse.   Your doctor will most likely have prescribed pain medications.      If you are going home after surgery you will receive individualized written care instructions before being discharged.  A responsible adult must drive you to and from the hospital on the day of your surgery and stay with you for  24 hours.  Discharge prescriptions can be filled by the hospital pharmacy during regular pharmacy hours.  If you are having surgery late in the day/evening your prescription may be e-prescribed to your pharmacy.  Please verify your pharmacy hours or chose a 24 hour pharmacy to avoid not having access to your prescription because your pharmacy has closed for the day.    If you are staying overnight following surgery, you will be transported to your hospital room following the recovery period.  [facility] has all private rooms.    If you have any questions please call Pre-Admission Testing at [phone].  Deductibles and co-payments are collected on the day of service. Please be prepared to pay the required co-pay, deductible or deposit on the day of service as defined by your plan.    Call your surgeon immediately if you experience any of the following symptoms:  Sore Throat  Shortness of Breath or difficulty breathing  Cough  Chills  Body soreness or muscle pain  Headache  Fever  New loss of taste or smell  Do not arrive for your surgery ill.  Your procedure will need to be rescheduled to another time.  You will need to call your physician before the day of surgery to avoid any unnecessary exposure to hospital staff as well as other patients.

## 2023-03-10 ENCOUNTER — TELEPHONE (OUTPATIENT)
Dept: ORTHOPEDIC SURGERY | Facility: HOSPITAL | Age: 78
End: 2023-03-10
Payer: MEDICARE

## 2023-03-10 NOTE — TELEPHONE ENCOUNTER
Called and spoke with Ms. Blair to see if she would be interested in going home after her surgery on Friday. She wasn't sure what she was going to feel like, or if she should. She said she would like to go home, but wants to make sure she is ready to go home. She has a friend that is going to be staying with her she wanted to call and ask them what they thought as well. She is going to think about it and talk to her friends and family and will let me know something in the next couple days. Questions answered at this time. Ms. Blair has my contact information should she need anything.     Ms. Kingston called me back at this time. She said she spoke with her friend who was going to be staying with her after the surgery and she won't be able to get here the day she was supposed to. Therefore she doesn't have any one that would be able to stay with her the night of surgery. Ms. Blair is planning on staying overnight at this time. MD notified.

## 2023-03-17 ENCOUNTER — ANESTHESIA (OUTPATIENT)
Dept: PERIOP | Facility: HOSPITAL | Age: 78
End: 2023-03-17
Payer: MEDICARE

## 2023-03-17 ENCOUNTER — HOSPITAL ENCOUNTER (OUTPATIENT)
Facility: HOSPITAL | Age: 78
Discharge: HOME | End: 2023-03-19
Attending: ORTHOPAEDIC SURGERY | Admitting: ORTHOPAEDIC SURGERY
Payer: MEDICARE

## 2023-03-17 ENCOUNTER — ANESTHESIA EVENT (OUTPATIENT)
Dept: PERIOP | Facility: HOSPITAL | Age: 78
End: 2023-03-17
Payer: MEDICARE

## 2023-03-17 ENCOUNTER — APPOINTMENT (OUTPATIENT)
Dept: GENERAL RADIOLOGY | Facility: HOSPITAL | Age: 78
End: 2023-03-17
Payer: MEDICARE

## 2023-03-17 DIAGNOSIS — Z96.642 STATUS POST LEFT HIP REPLACEMENT: Primary | ICD-10-CM

## 2023-03-17 PROBLEM — E44.0 MODERATE MALNUTRITION (HCC): Status: ACTIVE | Noted: 2023-03-17

## 2023-03-17 PROBLEM — Z96.649 HIP JOINT REPLACEMENT STATUS: Status: ACTIVE | Noted: 2023-03-17

## 2023-03-17 LAB
ANION GAP SERPL CALCULATED.3IONS-SCNC: 8.9 MMOL/L (ref 5–15)
BUN SERPL-MCNC: 10 MG/DL (ref 8–23)
BUN/CREAT SERPL: 13 (ref 7–25)
CALCIUM SPEC-SCNC: 8.7 MG/DL (ref 8.6–10.5)
CHLORIDE SERPL-SCNC: 100 MMOL/L (ref 98–107)
CO2 SERPL-SCNC: 25.1 MMOL/L (ref 22–29)
CREAT SERPL-MCNC: 0.77 MG/DL (ref 0.57–1)
EGFRCR SERPLBLD CKD-EPI 2021: 80.6 ML/MIN/1.73
GLUCOSE SERPL-MCNC: 119 MG/DL (ref 65–99)
HCT VFR BLD AUTO: 37.9 % (ref 34–46.6)
HGB BLD-MCNC: 12.9 G/DL (ref 12–15.9)
POTASSIUM SERPL-SCNC: 4.6 MMOL/L (ref 3.5–5.2)
SODIUM SERPL-SCNC: 134 MMOL/L (ref 136–145)

## 2023-03-17 PROCEDURE — 63710000001 ASPIRIN 81 MG TABLET DELAYED-RELEASE: Performed by: ORTHOPAEDIC SURGERY

## 2023-03-17 PROCEDURE — 85018 HEMOGLOBIN: CPT | Performed by: ORTHOPAEDIC SURGERY

## 2023-03-17 PROCEDURE — A9270 NON-COVERED ITEM OR SERVICE: HCPCS | Performed by: ORTHOPAEDIC SURGERY

## 2023-03-17 PROCEDURE — 73501 X-RAY EXAM HIP UNI 1 VIEW: CPT

## 2023-03-17 PROCEDURE — 80048 BASIC METABOLIC PNL TOTAL CA: CPT | Performed by: ORTHOPAEDIC SURGERY

## 2023-03-17 PROCEDURE — 72170 X-RAY EXAM OF PELVIS: CPT

## 2023-03-17 PROCEDURE — 63710000001 ALPRAZOLAM 0.25 MG TABLET: Performed by: ORTHOPAEDIC SURGERY

## 2023-03-17 PROCEDURE — 63710000001 METOPROLOL SUCCINATE XL 25 MG TABLET SUSTAINED-RELEASE 24 HOUR: Performed by: ORTHOPAEDIC SURGERY

## 2023-03-17 PROCEDURE — A9270 NON-COVERED ITEM OR SERVICE: HCPCS | Performed by: NURSE ANESTHETIST, CERTIFIED REGISTERED

## 2023-03-17 PROCEDURE — G0378 HOSPITAL OBSERVATION PER HR: HCPCS

## 2023-03-17 PROCEDURE — C1776 JOINT DEVICE (IMPLANTABLE): HCPCS | Performed by: ORTHOPAEDIC SURGERY

## 2023-03-17 PROCEDURE — 63710000001 HYDROCODONE-ACETAMINOPHEN 7.5-325 MG TABLET: Performed by: NURSE ANESTHETIST, CERTIFIED REGISTERED

## 2023-03-17 PROCEDURE — 63710000001 PREGABALIN 75 MG CAPSULE: Performed by: ORTHOPAEDIC SURGERY

## 2023-03-17 PROCEDURE — 25010000002 ONDANSETRON PER 1 MG: Performed by: ORTHOPAEDIC SURGERY

## 2023-03-17 PROCEDURE — 25010000002 PROPOFOL 10 MG/ML EMULSION: Performed by: NURSE ANESTHETIST, CERTIFIED REGISTERED

## 2023-03-17 PROCEDURE — 97161 PT EVAL LOW COMPLEX 20 MIN: CPT

## 2023-03-17 PROCEDURE — 25010000002 FENTANYL CITRATE (PF) 100 MCG/2ML SOLUTION: Performed by: NURSE ANESTHETIST, CERTIFIED REGISTERED

## 2023-03-17 PROCEDURE — 25010000002 DEXAMETHASONE SODIUM PHOSPHATE 20 MG/5ML SOLUTION: Performed by: NURSE ANESTHETIST, CERTIFIED REGISTERED

## 2023-03-17 PROCEDURE — 85014 HEMATOCRIT: CPT | Performed by: ORTHOPAEDIC SURGERY

## 2023-03-17 PROCEDURE — 25010000002 CLONIDINE PER 1 MG: Performed by: ORTHOPAEDIC SURGERY

## 2023-03-17 PROCEDURE — 63710000001 ACETAMINOPHEN 500 MG TABLET: Performed by: ORTHOPAEDIC SURGERY

## 2023-03-17 PROCEDURE — 25010000002 FENTANYL CITRATE (PF) 50 MCG/ML SOLUTION: Performed by: NURSE ANESTHETIST, CERTIFIED REGISTERED

## 2023-03-17 PROCEDURE — 25010000002 EPINEPHRINE 1 MG/ML SOLUTION 30 ML VIAL: Performed by: ORTHOPAEDIC SURGERY

## 2023-03-17 PROCEDURE — 76000 FLUOROSCOPY <1 HR PHYS/QHP: CPT

## 2023-03-17 PROCEDURE — 25010000002 ONDANSETRON PER 1 MG: Performed by: NURSE ANESTHETIST, CERTIFIED REGISTERED

## 2023-03-17 PROCEDURE — 25010000002 KETOROLAC TROMETHAMINE PER 15 MG: Performed by: ORTHOPAEDIC SURGERY

## 2023-03-17 PROCEDURE — 25010000002 ROPIVACAINE PER 1 MG: Performed by: ORTHOPAEDIC SURGERY

## 2023-03-17 PROCEDURE — 97110 THERAPEUTIC EXERCISES: CPT

## 2023-03-17 DEVICE — R3 0 DEGREE XLPE ACETABULAR LINER                                    36MM ID X OD 54MM
Type: IMPLANTABLE DEVICE | Site: HIP | Status: FUNCTIONAL
Brand: R3

## 2023-03-17 DEVICE — R3 3 HOLE ACETABULAR SHELL 54MM
Type: IMPLANTABLE DEVICE | Site: HIP | Status: FUNCTIONAL
Brand: R3 ACETABULAR

## 2023-03-17 DEVICE — POLARSTEM STANDARD NON-CEMENTED                                    WITH TI/HA 3
Type: IMPLANTABLE DEVICE | Site: HIP | Status: FUNCTIONAL
Brand: POLARSTEM

## 2023-03-17 DEVICE — DEV CONTRL TISS STRATAFIX SPIRAL MNCRYL UD 3/0 PLS 30CM: Type: IMPLANTABLE DEVICE | Site: HIP | Status: FUNCTIONAL

## 2023-03-17 DEVICE — IMPLANTABLE DEVICE: Type: IMPLANTABLE DEVICE | Status: FUNCTIONAL

## 2023-03-17 DEVICE — OXINIUM FEMORAL HEAD 12/14 TAPER                                    36 MM M/+4
Type: IMPLANTABLE DEVICE | Site: HIP | Status: FUNCTIONAL
Brand: OXINIUM

## 2023-03-17 RX ORDER — DOCUSATE SODIUM 100 MG/1
100 CAPSULE, LIQUID FILLED ORAL 2 TIMES DAILY PRN
Status: DISCONTINUED | OUTPATIENT
Start: 2023-03-17 | End: 2023-03-19 | Stop reason: HOSPADM

## 2023-03-17 RX ORDER — LABETALOL HYDROCHLORIDE 5 MG/ML
5 INJECTION, SOLUTION INTRAVENOUS
Status: DISCONTINUED | OUTPATIENT
Start: 2023-03-17 | End: 2023-03-17 | Stop reason: HOSPADM

## 2023-03-17 RX ORDER — HYDROCODONE BITARTRATE AND ACETAMINOPHEN 5; 325 MG/1; MG/1
1 TABLET ORAL ONCE AS NEEDED
Status: DISCONTINUED | OUTPATIENT
Start: 2023-03-17 | End: 2023-03-17 | Stop reason: HOSPADM

## 2023-03-17 RX ORDER — TRANEXAMIC ACID 100 MG/ML
INJECTION, SOLUTION INTRAVENOUS AS NEEDED
Status: DISCONTINUED | OUTPATIENT
Start: 2023-03-17 | End: 2023-03-17 | Stop reason: SURG

## 2023-03-17 RX ORDER — GLYCOPYRROLATE 0.2 MG/ML
INJECTION INTRAMUSCULAR; INTRAVENOUS AS NEEDED
Status: DISCONTINUED | OUTPATIENT
Start: 2023-03-17 | End: 2023-03-17 | Stop reason: SURG

## 2023-03-17 RX ORDER — FENTANYL CITRATE 50 UG/ML
INJECTION, SOLUTION INTRAMUSCULAR; INTRAVENOUS AS NEEDED
Status: DISCONTINUED | OUTPATIENT
Start: 2023-03-17 | End: 2023-03-17 | Stop reason: SURG

## 2023-03-17 RX ORDER — DIPHENHYDRAMINE HYDROCHLORIDE 50 MG/ML
12.5 INJECTION INTRAMUSCULAR; INTRAVENOUS
Status: DISCONTINUED | OUTPATIENT
Start: 2023-03-17 | End: 2023-03-17 | Stop reason: HOSPADM

## 2023-03-17 RX ORDER — ACETAMINOPHEN 500 MG
1000 TABLET ORAL ONCE
Status: COMPLETED | OUTPATIENT
Start: 2023-03-17 | End: 2023-03-17

## 2023-03-17 RX ORDER — ASPIRIN 81 MG/1
81 TABLET ORAL EVERY 12 HOURS
Qty: 60 TABLET | Refills: 0 | Status: SHIPPED | OUTPATIENT
Start: 2023-03-17 | End: 2023-04-17

## 2023-03-17 RX ORDER — SODIUM CHLORIDE, SODIUM LACTATE, POTASSIUM CHLORIDE, CALCIUM CHLORIDE 600; 310; 30; 20 MG/100ML; MG/100ML; MG/100ML; MG/100ML
9 INJECTION, SOLUTION INTRAVENOUS CONTINUOUS
Status: DISCONTINUED | OUTPATIENT
Start: 2023-03-17 | End: 2023-03-19 | Stop reason: HOSPADM

## 2023-03-17 RX ORDER — ONDANSETRON 2 MG/ML
INJECTION INTRAMUSCULAR; INTRAVENOUS AS NEEDED
Status: DISCONTINUED | OUTPATIENT
Start: 2023-03-17 | End: 2023-03-17 | Stop reason: SURG

## 2023-03-17 RX ORDER — NALOXONE HCL 0.4 MG/ML
0.2 VIAL (ML) INJECTION AS NEEDED
Status: DISCONTINUED | OUTPATIENT
Start: 2023-03-17 | End: 2023-03-17 | Stop reason: HOSPADM

## 2023-03-17 RX ORDER — PREGABALIN 75 MG/1
150 CAPSULE ORAL ONCE
Status: COMPLETED | OUTPATIENT
Start: 2023-03-17 | End: 2023-03-17

## 2023-03-17 RX ORDER — OXYCODONE HYDROCHLORIDE AND ACETAMINOPHEN 5; 325 MG/1; MG/1
2 TABLET ORAL EVERY 4 HOURS PRN
Status: DISCONTINUED | OUTPATIENT
Start: 2023-03-17 | End: 2023-03-19 | Stop reason: HOSPADM

## 2023-03-17 RX ORDER — MAGNESIUM HYDROXIDE 1200 MG/15ML
LIQUID ORAL AS NEEDED
Status: DISCONTINUED | OUTPATIENT
Start: 2023-03-17 | End: 2023-03-17 | Stop reason: HOSPADM

## 2023-03-17 RX ORDER — IPRATROPIUM BROMIDE AND ALBUTEROL SULFATE 2.5; .5 MG/3ML; MG/3ML
3 SOLUTION RESPIRATORY (INHALATION) ONCE AS NEEDED
Status: DISCONTINUED | OUTPATIENT
Start: 2023-03-17 | End: 2023-03-17 | Stop reason: HOSPADM

## 2023-03-17 RX ORDER — FENTANYL CITRATE 50 UG/ML
50 INJECTION, SOLUTION INTRAMUSCULAR; INTRAVENOUS
Status: DISCONTINUED | OUTPATIENT
Start: 2023-03-17 | End: 2023-03-17 | Stop reason: HOSPADM

## 2023-03-17 RX ORDER — ONDANSETRON 2 MG/ML
4 INJECTION INTRAMUSCULAR; INTRAVENOUS EVERY 6 HOURS PRN
Status: DISCONTINUED | OUTPATIENT
Start: 2023-03-17 | End: 2023-03-19 | Stop reason: HOSPADM

## 2023-03-17 RX ORDER — SODIUM CHLORIDE 9 MG/ML
100 INJECTION, SOLUTION INTRAVENOUS CONTINUOUS
Status: DISCONTINUED | OUTPATIENT
Start: 2023-03-17 | End: 2023-03-19 | Stop reason: HOSPADM

## 2023-03-17 RX ORDER — CLINDAMYCIN PHOSPHATE 900 MG/50ML
900 INJECTION INTRAVENOUS ONCE
Status: COMPLETED | OUTPATIENT
Start: 2023-03-17 | End: 2023-03-17

## 2023-03-17 RX ORDER — VALSARTAN 160 MG/1
160 TABLET ORAL EVERY MORNING
Status: DISCONTINUED | OUTPATIENT
Start: 2023-03-18 | End: 2023-03-19 | Stop reason: HOSPADM

## 2023-03-17 RX ORDER — ONDANSETRON 2 MG/ML
4 INJECTION INTRAMUSCULAR; INTRAVENOUS ONCE AS NEEDED
Status: DISCONTINUED | OUTPATIENT
Start: 2023-03-17 | End: 2023-03-17 | Stop reason: HOSPADM

## 2023-03-17 RX ORDER — ALPRAZOLAM 0.25 MG/1
0.5 TABLET ORAL NIGHTLY
Status: DISCONTINUED | OUTPATIENT
Start: 2023-03-17 | End: 2023-03-19 | Stop reason: HOSPADM

## 2023-03-17 RX ORDER — SODIUM CHLORIDE 0.9 % (FLUSH) 0.9 %
3 SYRINGE (ML) INJECTION EVERY 12 HOURS SCHEDULED
Status: DISCONTINUED | OUTPATIENT
Start: 2023-03-17 | End: 2023-03-17 | Stop reason: HOSPADM

## 2023-03-17 RX ORDER — EPHEDRINE SULFATE 50 MG/ML
5 INJECTION, SOLUTION INTRAVENOUS ONCE AS NEEDED
Status: DISCONTINUED | OUTPATIENT
Start: 2023-03-17 | End: 2023-03-17 | Stop reason: HOSPADM

## 2023-03-17 RX ORDER — ASPIRIN 81 MG/1
81 TABLET ORAL EVERY 12 HOURS SCHEDULED
Status: DISCONTINUED | OUTPATIENT
Start: 2023-03-17 | End: 2023-03-19 | Stop reason: HOSPADM

## 2023-03-17 RX ORDER — SODIUM CHLORIDE 0.9 % (FLUSH) 0.9 %
3-10 SYRINGE (ML) INJECTION AS NEEDED
Status: DISCONTINUED | OUTPATIENT
Start: 2023-03-17 | End: 2023-03-17 | Stop reason: HOSPADM

## 2023-03-17 RX ORDER — FENTANYL CITRATE 50 UG/ML
25 INJECTION, SOLUTION INTRAMUSCULAR; INTRAVENOUS
Status: DISCONTINUED | OUTPATIENT
Start: 2023-03-17 | End: 2023-03-17 | Stop reason: HOSPADM

## 2023-03-17 RX ORDER — CLINDAMYCIN PHOSPHATE 900 MG/50ML
900 INJECTION INTRAVENOUS EVERY 8 HOURS
Status: COMPLETED | OUTPATIENT
Start: 2023-03-17 | End: 2023-03-18

## 2023-03-17 RX ORDER — MIDAZOLAM HYDROCHLORIDE 1 MG/ML
0.5 INJECTION INTRAMUSCULAR; INTRAVENOUS
Status: DISCONTINUED | OUTPATIENT
Start: 2023-03-17 | End: 2023-03-17 | Stop reason: HOSPADM

## 2023-03-17 RX ORDER — ROCURONIUM BROMIDE 10 MG/ML
INJECTION, SOLUTION INTRAVENOUS AS NEEDED
Status: DISCONTINUED | OUTPATIENT
Start: 2023-03-17 | End: 2023-03-17 | Stop reason: SURG

## 2023-03-17 RX ORDER — PROMETHAZINE HYDROCHLORIDE 25 MG/1
25 SUPPOSITORY RECTAL ONCE AS NEEDED
Status: DISCONTINUED | OUTPATIENT
Start: 2023-03-17 | End: 2023-03-17 | Stop reason: HOSPADM

## 2023-03-17 RX ORDER — HYDROCODONE BITARTRATE AND ACETAMINOPHEN 7.5; 325 MG/1; MG/1
1 TABLET ORAL EVERY 4 HOURS PRN
Status: DISCONTINUED | OUTPATIENT
Start: 2023-03-17 | End: 2023-03-17 | Stop reason: HOSPADM

## 2023-03-17 RX ORDER — MELOXICAM 15 MG/1
15 TABLET ORAL DAILY
Status: DISCONTINUED | OUTPATIENT
Start: 2023-03-17 | End: 2023-03-19 | Stop reason: HOSPADM

## 2023-03-17 RX ORDER — PROMETHAZINE HYDROCHLORIDE 25 MG/1
25 TABLET ORAL ONCE AS NEEDED
Status: DISCONTINUED | OUTPATIENT
Start: 2023-03-17 | End: 2023-03-17 | Stop reason: HOSPADM

## 2023-03-17 RX ORDER — OXYCODONE HYDROCHLORIDE AND ACETAMINOPHEN 5; 325 MG/1; MG/1
1 TABLET ORAL EVERY 4 HOURS PRN
Status: DISCONTINUED | OUTPATIENT
Start: 2023-03-17 | End: 2023-03-19 | Stop reason: HOSPADM

## 2023-03-17 RX ORDER — OXYCODONE HYDROCHLORIDE AND ACETAMINOPHEN 5; 325 MG/1; MG/1
1 TABLET ORAL EVERY 4 HOURS PRN
Qty: 50 TABLET | Refills: 0 | Status: SHIPPED | OUTPATIENT
Start: 2023-03-17

## 2023-03-17 RX ORDER — ONDANSETRON 4 MG/1
4 TABLET, FILM COATED ORAL EVERY 6 HOURS PRN
Status: DISCONTINUED | OUTPATIENT
Start: 2023-03-17 | End: 2023-03-19 | Stop reason: HOSPADM

## 2023-03-17 RX ORDER — HYDROMORPHONE HYDROCHLORIDE 1 MG/ML
0.25 INJECTION, SOLUTION INTRAMUSCULAR; INTRAVENOUS; SUBCUTANEOUS
Status: DISCONTINUED | OUTPATIENT
Start: 2023-03-17 | End: 2023-03-17 | Stop reason: HOSPADM

## 2023-03-17 RX ORDER — SUMATRIPTAN 100 MG/1
100 TABLET, FILM COATED ORAL AS NEEDED
Status: DISCONTINUED | OUTPATIENT
Start: 2023-03-17 | End: 2023-03-19 | Stop reason: HOSPADM

## 2023-03-17 RX ORDER — LIDOCAINE HYDROCHLORIDE 20 MG/ML
INJECTION, SOLUTION EPIDURAL; INFILTRATION; INTRACAUDAL; PERINEURAL AS NEEDED
Status: DISCONTINUED | OUTPATIENT
Start: 2023-03-17 | End: 2023-03-17 | Stop reason: SURG

## 2023-03-17 RX ORDER — DEXAMETHASONE SODIUM PHOSPHATE 4 MG/ML
INJECTION, SOLUTION INTRA-ARTICULAR; INTRALESIONAL; INTRAMUSCULAR; INTRAVENOUS; SOFT TISSUE AS NEEDED
Status: DISCONTINUED | OUTPATIENT
Start: 2023-03-17 | End: 2023-03-17 | Stop reason: SURG

## 2023-03-17 RX ORDER — PROPOFOL 10 MG/ML
VIAL (ML) INTRAVENOUS AS NEEDED
Status: DISCONTINUED | OUTPATIENT
Start: 2023-03-17 | End: 2023-03-17 | Stop reason: SURG

## 2023-03-17 RX ORDER — HYDRALAZINE HYDROCHLORIDE 20 MG/ML
5 INJECTION INTRAMUSCULAR; INTRAVENOUS
Status: DISCONTINUED | OUTPATIENT
Start: 2023-03-17 | End: 2023-03-17 | Stop reason: HOSPADM

## 2023-03-17 RX ORDER — FAMOTIDINE 20 MG/1
40 TABLET, FILM COATED ORAL DAILY
Status: DISCONTINUED | OUTPATIENT
Start: 2023-03-17 | End: 2023-03-19 | Stop reason: HOSPADM

## 2023-03-17 RX ORDER — NALOXONE HCL 0.4 MG/ML
0.1 VIAL (ML) INJECTION
Status: DISCONTINUED | OUTPATIENT
Start: 2023-03-17 | End: 2023-03-19 | Stop reason: HOSPADM

## 2023-03-17 RX ORDER — FLUMAZENIL 0.1 MG/ML
0.2 INJECTION INTRAVENOUS AS NEEDED
Status: DISCONTINUED | OUTPATIENT
Start: 2023-03-17 | End: 2023-03-17 | Stop reason: HOSPADM

## 2023-03-17 RX ORDER — PHENYLEPHRINE HCL IN 0.9% NACL 1 MG/10 ML
SYRINGE (ML) INTRAVENOUS AS NEEDED
Status: DISCONTINUED | OUTPATIENT
Start: 2023-03-17 | End: 2023-03-17 | Stop reason: SURG

## 2023-03-17 RX ORDER — LIDOCAINE HYDROCHLORIDE 10 MG/ML
0.5 INJECTION, SOLUTION EPIDURAL; INFILTRATION; INTRACAUDAL; PERINEURAL ONCE AS NEEDED
Status: DISCONTINUED | OUTPATIENT
Start: 2023-03-17 | End: 2023-03-17 | Stop reason: HOSPADM

## 2023-03-17 RX ORDER — FAMOTIDINE 10 MG/ML
20 INJECTION, SOLUTION INTRAVENOUS ONCE
Status: COMPLETED | OUTPATIENT
Start: 2023-03-17 | End: 2023-03-17

## 2023-03-17 RX ORDER — METOPROLOL SUCCINATE 25 MG/1
25 TABLET, EXTENDED RELEASE ORAL EVERY EVENING
Status: DISCONTINUED | OUTPATIENT
Start: 2023-03-17 | End: 2023-03-19 | Stop reason: HOSPADM

## 2023-03-17 RX ADMIN — ASPIRIN 81 MG: 81 TABLET, COATED ORAL at 21:11

## 2023-03-17 RX ADMIN — PROPOFOL 150 MG: 10 INJECTION, EMULSION INTRAVENOUS at 11:08

## 2023-03-17 RX ADMIN — CLINDAMYCIN PHOSPHATE 900 MG: 900 INJECTION, SOLUTION INTRAVENOUS at 18:58

## 2023-03-17 RX ADMIN — SODIUM CHLORIDE 100 ML/HR: 9 INJECTION, SOLUTION INTRAVENOUS at 19:54

## 2023-03-17 RX ADMIN — FAMOTIDINE 20 MG: 10 INJECTION INTRAVENOUS at 09:54

## 2023-03-17 RX ADMIN — ONDANSETRON 4 MG: 2 INJECTION INTRAMUSCULAR; INTRAVENOUS at 19:54

## 2023-03-17 RX ADMIN — ROCURONIUM BROMIDE 40 MG: 10 INJECTION, SOLUTION INTRAVENOUS at 11:08

## 2023-03-17 RX ADMIN — HYDROCODONE BITARTRATE AND ACETAMINOPHEN 1 TABLET: 7.5; 325 TABLET ORAL at 12:25

## 2023-03-17 RX ADMIN — Medication 100 MCG: at 11:20

## 2023-03-17 RX ADMIN — ONDANSETRON 4 MG: 2 INJECTION INTRAMUSCULAR; INTRAVENOUS at 11:53

## 2023-03-17 RX ADMIN — Medication 100 MCG: at 11:52

## 2023-03-17 RX ADMIN — SUGAMMADEX 200 MG: 100 INJECTION, SOLUTION INTRAVENOUS at 12:00

## 2023-03-17 RX ADMIN — FENTANYL CITRATE 50 MCG: 50 INJECTION, SOLUTION INTRAMUSCULAR; INTRAVENOUS at 11:08

## 2023-03-17 RX ADMIN — METOPROLOL SUCCINATE 25 MG: 25 TABLET, EXTENDED RELEASE ORAL at 21:08

## 2023-03-17 RX ADMIN — ACETAMINOPHEN 1000 MG: 500 TABLET, FILM COATED ORAL at 09:54

## 2023-03-17 RX ADMIN — FENTANYL CITRATE 25 MCG: 50 INJECTION, SOLUTION INTRAMUSCULAR; INTRAVENOUS at 12:25

## 2023-03-17 RX ADMIN — LIDOCAINE HYDROCHLORIDE 60 MG: 20 INJECTION, SOLUTION EPIDURAL; INFILTRATION; INTRACAUDAL; PERINEURAL at 11:08

## 2023-03-17 RX ADMIN — Medication 200 MCG: at 11:57

## 2023-03-17 RX ADMIN — GLYCOPYRROLATE 0.2 MG: 1 INJECTION INTRAMUSCULAR; INTRAVENOUS at 11:08

## 2023-03-17 RX ADMIN — SODIUM CHLORIDE, POTASSIUM CHLORIDE, SODIUM LACTATE AND CALCIUM CHLORIDE 9 ML/HR: 600; 310; 30; 20 INJECTION, SOLUTION INTRAVENOUS at 09:48

## 2023-03-17 RX ADMIN — FENTANYL CITRATE 25 MCG: 50 INJECTION, SOLUTION INTRAMUSCULAR; INTRAVENOUS at 12:30

## 2023-03-17 RX ADMIN — TRANEXAMIC ACID 1000 MG: 1 INJECTION, SOLUTION INTRAVENOUS at 11:18

## 2023-03-17 RX ADMIN — DEXAMETHASONE SODIUM PHOSPHATE 10 MG: 4 INJECTION, SOLUTION INTRAMUSCULAR; INTRAVENOUS at 11:21

## 2023-03-17 RX ADMIN — FENTANYL CITRATE 50 MCG: 50 INJECTION, SOLUTION INTRAMUSCULAR; INTRAVENOUS at 11:42

## 2023-03-17 RX ADMIN — CLINDAMYCIN PHOSPHATE 900 MG: 900 INJECTION, SOLUTION INTRAVENOUS at 10:55

## 2023-03-17 RX ADMIN — ONDANSETRON 4 MG: 2 INJECTION INTRAMUSCULAR; INTRAVENOUS at 14:12

## 2023-03-17 RX ADMIN — ALPRAZOLAM 0.5 MG: 0.25 TABLET ORAL at 21:08

## 2023-03-17 RX ADMIN — PREGABALIN 150 MG: 75 CAPSULE ORAL at 09:54

## 2023-03-17 RX ADMIN — TRANEXAMIC ACID 1000 MG: 1 INJECTION, SOLUTION INTRAVENOUS at 11:45

## 2023-03-17 NOTE — ANESTHESIA PROCEDURE NOTES
Airway  Urgency: elective    Date/Time: 3/17/2023 11:12 AM  Airway not difficult    General Information and Staff    Patient location during procedure: OR  Anesthesiologist: Sebastian Lynn MD  CRNA/CAA: Sierra Ferrer CRNA    Indications and Patient Condition  Indications for airway management: airway protection    Preoxygenated: yes  Mask difficulty assessment: 1 - vent by mask    Final Airway Details  Final airway type: endotracheal airway      Successful airway: ETT  Cuffed: yes   Successful intubation technique: direct laryngoscopy  Facilitating devices/methods: intubating stylet  Endotracheal tube insertion site: oral  Blade: Bruna  Blade size: 3  ETT size (mm): 7.0  Cormack-Lehane Classification: grade I - full view of glottis  Placement verified by: chest auscultation and capnometry   Cuff volume (mL): 8  Measured from: lips  ETT/EBT  to lips (cm): 19  Number of attempts at approach: 1  Assessment: lips, teeth, and gum same as pre-op and atraumatic intubation

## 2023-03-17 NOTE — ANESTHESIA PREPROCEDURE EVALUATION
Anesthesia Evaluation     history of anesthetic complications: PONV  NPO Solid Status: > 8 hours             Airway   Mallampati: I  Dental      Pulmonary    (+) a smoker (quit 7 days ago) Current, asthma (no inhaler use),  (-) sleep apnea    ROS comment: Negative patient screen for JIM    Cardiovascular     (+) hypertension, hyperlipidemia,   (-) CAD, CHIN      Neuro/Psych  (+) psychiatric history Anxiety,    (-) seizures, CVA  GI/Hepatic/Renal/Endo    (+)  GERD well controlled,      Musculoskeletal     Abdominal    Substance History      OB/GYN          Other                        Anesthesia Plan    ASA 3     general       Anesthetic plan, risks, benefits, and alternatives have been provided, discussed and informed consent has been obtained with: patient.        CODE STATUS:

## 2023-03-17 NOTE — CONSULTS
Nutrition Services    Patient Name:  Debo Blair  YOB: 1945  MRN: [id number]  Admit Date:  3/17/2023    Assessment Date:  03/17/23    77 y.o. female with end stage arthritis of left hip. Pt to underwent hip surgery. Nutrition consult for report of reduced oral intake and significant unintentional wt loss. Pt reports 30# wt loss in 6 months, wt index shows wt loss occurred over a longer period of time. Visited pt at bedside, pt appears frail and reports decreased intake related to decreased mobility. Pt reports she has very poor appetite has not had the strength to make it to the kitchen to cook for her self. Discussed pt food preferences and provided alternative menus. Encouraged adequate PO intake and offered nutrition supplement.     Performed nutrition focused physical exam (NFPE). Patient meets criteria for : Moderate (non-severe) Malnutrition according to ASPEN/AND guidelines related to chronic illness based on the following: Poor PO intake and signs of muscle wasting/fat loss.    Recommendations/plan of Care:  1. Ordered Boost Plus TID to provide additional calories and protein.   2. Monitor PO intake, weight status and clinical course.     RD to follow per protocol.     CLINICAL NUTRITION ASSESSMENT      Reason for Assessment Nurse Admission Screen     Diagnosis/Problem   CC:  DX:    Medical/Surgical History Past Medical History:   Diagnosis Date   • Anxiety    • Chest pain syndrome     FOLLOWED BY [facility]   • Coronary artery disease 08/09/2022   • Diverticulosis    • Fibrocystic breast changes 1971   • GERD (gastroesophageal reflux disease)    • History of colon polyps    • Hyperlipidemia    • Hypertension    • IBS (irritable bowel syndrome)    • Lactose intolerance    • Lichen sclerosus    • Melanosis coli    • Nocturia    • Osteoarthritis    • Osteoporosis    • Palpitations    • Paralytic ileus (HCC) 1975   • PONV (postoperative nausea and vomiting)        Past  "Surgical History:   Procedure Laterality Date   • ANAL FISSURECTOMY  2004   • CHOLECYSTECTOMY     • COLON RESECTION N/A 03/21/2016    Procedure: COLON RESECTION LAPAROSCOPIC LEFT MINDY COLECTOMY ;  Surgeon: Nikki Smith MD;  Location: [facility] MAIN OR;  Service:    • COLONOSCOPY  01/18/2016    polyps, NBIH, tubular adenoma w/low grade dysplasia   • COLONOSCOPY N/A 08/22/2018    Procedure: COLONOSCOPY TO CECUM AND INTO TERMINAL ILEUM WITH HOT SNARE POLYPECTOMY,  5 ML SALINE LIFT INJECTION, 1 ML TATTOO INK INJECTION, RESOLUTION CLIP X1 PLACEMENT, AND APC CAUTERY TO TRANSVERSE COLON POLYP SITE;  Surgeon: Luna Becerra MD;  Location: [facility] ENDOSCOPY;  Service: Gastroenterology   • COLONOSCOPY N/A 06/02/2021    Procedure: COLONOSCOPY to anastamosis and cecum with cold/hot snare polypectomies;  Surgeon: Luna Becerra MD;  Location: [facility] ENDOSCOPY;  Service: Gastroenterology;  Laterality: N/A;  Pre: h/x of colon polyps  Post: diverticulosis, polyps, left sided anastamosis, hemorrhoids   • HYSTERECTOMY     • SKIN TAG REMOVAL  2004        Encounter Information        Nutrition History:     Food Preferences:    Supplements:    Factors Affecting Intake: decreased appetite, pain issues     Anthropometrics        Current Height  Current Weight  BMI kg/m2 Height: 170.2 cm (67\")  Weight: 62.6 kg (138 lb) (03/17/23 1326)  Body mass index is 21.61 kg/m².   Adjusted BMI (if applicable)        Admission Weight 138#       Ideal Body Weight (IBW) 135#   Adjusted IBW (if applicable)        Usual Body Weight (UBW) 170#   Weight Change/Trend Loss, Amount/Timeframe: 32# (19% of body wt) in 2 years       Weight History Wt Readings from Last 30 Encounters:   03/17/23 1326 62.6 kg (138 lb)   03/06/23 1326 62.6 kg (138 lb 1.6 oz)   11/16/22 1251 64.7 kg (142 lb 11.2 oz)   10/04/22 1322 67.9 kg (149 lb 12.8 oz)   06/02/21 0951 77.1 kg (170 lb)   03/27/19 1255 74.8 kg (165 lb)   08/22/18 1026 75.9 kg (167 lb 4 oz)   06/25/17 0939 " 72.6 kg (160 lb)   05/13/17 1642 70.3 kg (155 lb)   02/21/17 1438 70.3 kg (155 lb)   12/12/16 1257 72.6 kg (160 lb)   04/22/16 1329 65.2 kg (143 lb 12.8 oz)   03/29/16 1017 67.4 kg (148 lb 8 oz)   03/22/16 1449 64.9 kg (143 lb)   03/21/16 1350 69.3 kg (152 lb 11.2 oz)   03/21/16 0600 65.2 kg (143 lb 12.8 oz)   03/07/16 1102 66.5 kg (146 lb 8 oz)   02/19/16 1401 68.3 kg (150 lb 8 oz)           --  Tests/Procedures        Tests/Procedures X-Ray     Labs       Pertinent Labs    Results from last 7 days   Lab Units 03/17/23  1445   SODIUM mmol/L 134*   POTASSIUM mmol/L 4.6   CHLORIDE mmol/L 100   CO2 mmol/L 25.1   BUN mg/dL 10   CREATININE mg/dL 0.77   CALCIUM mg/dL 8.7   GLUCOSE mg/dL 119*     Results from last 7 days   Lab Units 03/17/23  1445   HEMOGLOBIN g/dL 12.9   HEMATOCRIT % 37.9         No results found for: COVID19  Lab Results   Component Value Date    HGBA1C 5.50 03/06/2023          Medications           Scheduled Medications ALPRAZolam, 0.5 mg, Oral, Nightly  aspirin, 81 mg, Oral, Q12H  clindamycin, 900 mg, Intravenous, Q8H  famotidine, 40 mg, Oral, Daily  meloxicam, 15 mg, Oral, Daily  metoprolol succinate XL, 25 mg, Oral, Q PM  [START ON 3/18/2023] valsartan, 160 mg, Oral, QAM       Infusions lactated ringers, 9 mL/hr, Last Rate: 9 mL/hr (03/17/23 1102)  sodium chloride, 100 mL/hr       PRN Medications •  docusate sodium  •  HYDROmorphone **AND** naloxone  •  ondansetron **OR** ondansetron  •  oxyCODONE-acetaminophen  •  oxyCODONE-acetaminophen  •  SUMAtriptan     Physical Findings          Physical Appearance alert, oriented   Oral/Mouth Cavity WNL   Edema  2+ (mild) left hip    Gastrointestinal hypoactive bowel sounds, last bowel movement: 3/17   Skin  surgical incision   Tubes/Drains none   NFPE Consented to exam   --  Malnutrition Severity Assessment      Patient meets criteria for : Moderate (non-severe) Malnutrition (according to ASPEN/AND guidelines related to chronic illness based on the  following: Poor PO intake and signs of muscle wasting/fat loss.)  Malnutrition Type (last 8 hours)     Malnutrition Severity Assessment     Row Name 03/17/23 1619       Malnutrition Severity Assessment    Malnutrition Type Chronic Disease - Related Malnutrition    Row Name 03/17/23 1619       Insufficient Energy Intake     Insufficient Energy Intake Findings Moderate    Insufficient Energy Intake  <75% of est. energy requirement for > or equal to 3 months    Row Name 03/17/23 1619       Muscle Loss    Loss of Muscle Mass Findings Moderate    [place] Region Moderate - slight depression    Clavicle Bone Region Moderate - some protrusion in females, visible in males    Acromion Bone Region Moderate - acromion may slightly protrude    Row Name 03/17/23 1619       Fat Loss    Subcutaneous Fat Loss Findings Moderate    Orbital Region  Moderate -  somewhat hollowness, slightly dark circles    Upper Arm Region Moderate - some fat tissue, not ample    Row Name 03/17/23 1619       Declining Functional Status    Declining Functional Status Findings Measurably Reduced    Row Name 03/17/23 1619       Criteria Met (Must meet criteria for severity in at least 2 of these categories: M Wasting, Fat Loss, Fluid, Secondary Signs, Wt. Status, Intake)    Patient meets criteria for  Moderate (non-severe) Malnutrition  according to ASPEN/AND guidelines related to chronic illness based on the following: Poor PO intake and signs of muscle wasting/fat loss.                   Current Nutrition Orders & Evaluation of Intake       Oral Nutrition     Food Allergies NKFA   Current PO Diet Diet: Regular/House Diet; Texture: Regular Texture (IDDSI 7); Fluid Consistency: Thin (IDDSI 0)   Supplement n/a   PO Evaluation     % PO Intake Pending     # of Days Evaluated    --  PES STATEMENT / NUTRITION DIAGNOSIS      Nutrition Dx Problem  Problem: Malnutrition  Etiology: Factors Affecting Nutrition (decreased appetite, reduced mobility)   Signs/Symptoms:  Report of Minimal PO Intake and NFPE Results    Comment:    --  NUTRITION INTERVENTION / PLAN OF CARE      Intervention Goal(s) Reduce/improve symptoms, Meet estimated needs, Increase intake, Maintain weight and No significant weight loss         RD Intervention/Action Interview for preferences, Menu provided, Supplement provided, Encourage intake, Follow Tx Progress and Care plan reviewed         Prescription/Orders:       PO Diet       Supplements Boost Plus TID       Snacks       Enteral Nutrition       Parenteral Nutrition    New Prescription Ordered? Yes   --      Monitor/Evaluation Per protocol, PO intake, Supplement intake, Pertinent labs, Weight   Discharge Plan/Needs Pending clinical course   Education Will instruct as appropriate   --    RD to follow per protocol.      Electronically signed by:  Penny Botello RD  03/17/23 16:07 EDT

## 2023-03-17 NOTE — PLAN OF CARE
Problem: Malnutrition  Goal: Improved Nutritional Intake  Intervention: Promote and Optimize Oral Intake  Flowsheets (Taken 3/17/2023 8279)  Oral Nutrition Promotion: calorie-dense liquids provided   Goal Outcome Evaluation:         Performed nutrition focused physical exam (NFPE). Patient meets criteria for : Moderate (non-severe) Malnutrition according to ASPEN/AND guidelines related to chronic illness based on the following: Poor PO intake and signs of muscle wasting/fat loss.    Recommendations/plan of Care:  1. Ordered Boost Plus TID to provide additional calories and protein.   2. Monitor PO intake, weight status and clinical course.

## 2023-03-17 NOTE — PLAN OF CARE
Goal Outcome Evaluation:           Progress: improving  Outcome Evaluation: vss, nvi, ambulating assist of 1 with walker- L leg buckling a little still, voiding per BRP, nausea with emesis x1-zofran helps, plan to dc home tomorrow

## 2023-03-17 NOTE — PLAN OF CARE
Goal Outcome Evaluation:  Plan of Care Reviewed With: patient           Outcome Evaluation: Patient is a 77 y.o female who presents POD0 L JESSY anterior approach. Patient AOx4 supine in bed upon arrival. Patient lives alone with 3 BENY. Patient reports her friend will stay with her at d/c and her son will help out as needed. Patient reports need for rwx at d/c. Patient sat up to EOB with CGA this date. Patient performed STS from EOB with CGA and VCs for hand placement. Patient ambulated 15ft with rwx and CGA-Jeramie. Gait slow and antalgic with L knee buckling. Reviewed JESSY post op protocol with patient. Patient UIC at end of session. Patient would continue to benefit from skilled PT intervention to address deficits in functional mobility and maximize safety and independence. Anticipate home with assist and HHPT at d/c. PT will continue to monitor.

## 2023-03-17 NOTE — DISCHARGE PLACEMENT REQUEST
"Liv Blair (77 y.o. Female)     Date of Birth   1945    Social Security Number       Address   [address] [place] [address]    Home Phone   [phone]    MRN   [id number]       Anabaptism   Jainism    Marital Status   Single                            Admission Date   3/17/23    Admission Type   Elective    Admitting Provider   Israel Carcamo II, MD    Attending Provider   Israel Carcamo II, MD    Department, Room/Bed   [address], 76/1       Discharge Date       Discharge Disposition       Discharge Destination                               Attending Provider: Israel Carcamo II, MD    Allergies: Latex, Neosporin [Neomycin-bacitracin Zn-polymyx], Sulfa Antibiotics, Meclizine, Codeine, Gabapentin, Pregabalin, Estrace [Estradiol], Keflex [Cephalexin], Tramadol, Trazodone    Isolation: None   Infection: None   Code Status: Prior    Ht: 170.2 cm (67\")   Wt: 62.6 kg (138 lb 1.6 oz)    Admission Cmt: None   Principal Problem: Hip joint replacement status [Z96.649]                 Active Insurance as of 3/17/2023     Primary Coverage     Payor Plan Insurance Group Employer/Plan Group    MEDICARE MEDICARE A & B      Payor Plan Address Payor Plan Phone Number Payor Plan Fax Number Effective Dates    [address] [phone]  9/1/2012 - None Entered    [facility] [id number]       Subscriber Name Subscriber Birth Date Member ID       LIV BLAIR 1945 [id number]           Secondary Coverage     Payor Plan Insurance Group Employer/Plan Group    ANTHEM BLUE CROSS [facility] SUPP [id number]     Payor Plan Address Payor Plan Phone Number Payor Plan Fax Number Effective Dates    [address]   12/1/2016 - None Entered    [facility] [id number]       Subscriber Name Subscriber Birth Date Member ID       LIV BLAIR 1945 [id number]                 Emergency Contacts      (Rel.) Home Phone Work Phone Mobile Phone    " Adolfo Blair ([facility]) [phone] -- [phone]

## 2023-03-17 NOTE — ANESTHESIA POSTPROCEDURE EVALUATION
Patient: Debo Blair    Procedure Summary     Date: 03/17/23 Room / Location: [facility] OR [address] [place] [address] [facility]    Anesthesia Start: 1102 Anesthesia Stop: 1217    Procedure: LEFT TOTAL HIP ARTHROPLASTY ANTERIOR (Left: Hip) Diagnosis:     Surgeons: Israel Carcamo II, MD Provider: Sebastian Lynn MD    Anesthesia Type: general ASA Status: 3          Anesthesia Type: general    Vitals  Vitals Value Taken Time   /75 03/17/23 1300   Temp 36.6 °C (97.9 °F) 03/17/23 1300   Pulse 73 03/17/23 1308   Resp 18 03/17/23 1300   SpO2 100 % 03/17/23 1308   Vitals shown include unvalidated device data.        Post Anesthesia Care and Evaluation    Patient location during evaluation: bedside  Patient participation: complete - patient participated  Level of consciousness: awake and alert  Pain management: adequate    Airway patency: patent  Anesthetic complications: No anesthetic complications  PONV Status: controlled  Cardiovascular status: acceptable  Respiratory status: acceptable  Hydration status: acceptable    Comments: /75   Pulse 79   Temp 36.6 °C (97.9 °F) (Oral)   Resp 18   LMP  (LMP Unknown)   SpO2 100%

## 2023-03-17 NOTE — H&P
Orthopaedic Surgery  History & Physical For Elective Total Hip  Dr. DANIEL Carcamo II  [phone]    HPI:  Patient is a 77 y.o. Not  or  female who presents with End-stage arthritis of the left hip.  They failed conservative treatment of their hip pain and a thorough discussion of the risks and benefits of surgery was had.  The patient wishes to continue with elective total hip replacement, they were scheduled and are here for surgery. They did get medical clearance as well as a thorough preoperative workup.     MEDICAL HISTORY  Past Medical History:   Diagnosis Date   • Anxiety    • Chest pain syndrome     FOLLOWED BY DR [facility]   • Coronary artery disease 08/09/2022   • Diverticulosis    • Fibrocystic breast changes 1971   • GERD (gastroesophageal reflux disease)    • History of colon polyps    • Hyperlipidemia    • Hypertension    • IBS (irritable bowel syndrome)    • Lactose intolerance    • Lichen sclerosus    • Melanosis coli    • Nocturia    • Osteoarthritis    • Osteoporosis    • Palpitations    • Paralytic ileus (HCC) 1975   • PONV (postoperative nausea and vomiting)    ·   Past Surgical History:   Procedure Laterality Date   • ANAL FISSURECTOMY  2004   • CHOLECYSTECTOMY     • COLON RESECTION N/A 03/21/2016    Procedure: COLON RESECTION LAPAROSCOPIC LEFT MINDY COLECTOMY ;  Surgeon: Nikki Smith MD;  Location: [facility] OR;  Service:    • COLONOSCOPY  01/18/2016    polyps, NBIH, tubular adenoma w/low grade dysplasia   • COLONOSCOPY N/A 08/22/2018    Procedure: COLONOSCOPY TO CECUM AND INTO TERMINAL ILEUM WITH HOT SNARE POLYPECTOMY,  5 ML SALINE LIFT INJECTION, 1 ML TATTOO INK INJECTION, RESOLUTION CLIP X1 PLACEMENT, AND APC CAUTERY TO TRANSVERSE COLON POLYP SITE;  Surgeon: Luna Becerra MD;  Location: [facility] ENDOSCOPY;  Service: Gastroenterology   • COLONOSCOPY N/A 06/02/2021    Procedure: COLONOSCOPY to anastamosis and cecum with cold/hot snare  polypectomies;  Surgeon: Luna Becerra MD;  Location: [facility] ENDOSCOPY;  Service: Gastroenterology;  Laterality: N/A;  Pre: h/x of colon polyps  Post: diverticulosis, polyps, left sided anastamosis, hemorrhoids   • HYSTERECTOMY     • SKIN TAG REMOVAL  2004   ·   Prior to Admission medications    Medication Sig Start Date End Date Taking? Authorizing Provider   acetaminophen (TYLENOL) 500 MG tablet Take 1 tablet by mouth Every 6 (Six) Hours As Needed for Mild Pain.   Yes Dandre Cabrera MD   ALPRAZolam (XANAX) 1 MG tablet Take 0.5 mg by mouth Every Night. 1/18/16  Yes Dandre Cabrera MD   Chlorhexidine Gluconate Cloth 2 % pads Apply  topically. PRIOR TO OR   Yes Dandre Cabrera MD   Eyelid Cleansers (Avenova) 0.01 % solution Administer  to both eyes Every Night.   Yes Dandre Cabrera MD   metoprolol succinate XL (TOPROL-XL) 25 MG 24 hr tablet Take 1 tablet by mouth Every Evening.   Yes Dandre Cabrera MD   montelukast (SINGULAIR) 10 MG tablet Take 1 tablet by mouth Every Evening. 12/8/15  Yes Dandre Cabrera MD   polyethylene glycol (MIRALAX) 17 g packet Take 17 g by mouth Every Night.   Yes Dandre Cabrera MD   Probiotic Product (PROBIOTIC DAILY PO) Take 1 tablet by mouth As Needed.   Yes Dandre Cabrera MD   simethicone (MYLICON) 125 MG chewable tablet Chew 1 tablet Every 6 (Six) Hours As Needed for Flatulence.   Yes Dandre Cabrera MD   valsartan (DIOVAN) 80 MG tablet Take 2 tablets by mouth Every Morning. 12/30/15  Yes Dandre Cabrera MD   coenzyme Q10 100 MG capsule Take 2 capsules by mouth Every Evening.    Dandre Cabrera MD   pravastatin (PRAVACHOL) 20 MG tablet Take 1 tablet by mouth Every Night.    Dandre Cabrera MD   SUMAtriptan (IMITREX) 100 MG tablet Take 1 tablet by mouth As Needed for Migraine. 1/15/16   Dandre Cabrera MD   triamcinolone (KENALOG) 0.1 % ointment Apply  topically to the appropriate area as directed 2  (Two) Times a Day. 22   ProviderDandre MD   valACYclovir (VALTREX) 1000 MG tablet Take 1 tablet by mouth As Needed (FEVER BLISTERS).    ProviderDandre MD   vitamin D (ERGOCALCIFEROL) 89212 UNITS capsule capsule 1 capsule Every 7 (Seven) Days. SATURDAYS 12/30/15   ProviderDandre MD   ·   Allergies   Allergen Reactions   • Latex Rash   • Neosporin [Neomycin-Bacitracin Zn-Polymyx] Rash   • Sulfa Antibiotics Nausea And Vomiting   • Meclizine Other (See Comments)     LIP TINGLING    • Codeine Nausea And Vomiting   • Gabapentin Swelling     ON LIPS   • Pregabalin Other (See Comments)     CONSTIPATION   • Estrace [Estradiol] Rash   • Keflex [Cephalexin] Itching and Rash   • Tramadol Palpitations     Heart racing   • Trazodone Palpitations   ·   Most Recent Immunizations   Administered Date(s) Administered   • COVID-19 (PFIZER) BIVALENT BOOSTER 12+YRS 2022   • COVID-19 (PFIZER) PURPLE CAP 10/02/2021   • Covid-19 (Pfizer) Gray Cap 2022   • Tdap 2017   ·   Social History     Tobacco Use   • Smoking status: Some Days     Packs/day: 1.00     Years: 40.00     Pack years: 40.00     Types: Cigarettes     Start date: 1970     Last attempt to quit: 2015     Years since quittin.2   • Smokeless tobacco: Former     Quit date: 3/2/2016   • Tobacco comments:     QUIT SMOKING DAILY IN  (1 PPD X 40 YEARS) , SMOKES OCCASIONALLY NOW    Substance Use Topics   • Alcohol use: Yes     Alcohol/week: 1.0 standard drink     Types: 1 Glasses of wine per week     Comment: 1 GLASS WINE/ NIGHT   ·    Social History     Substance and Sexual Activity   Drug Use No   ·     REVIEW OF SYSTEMS:  · Head: negative for headache  · Respiratory: negative for shortness of breath.   · Cardiovascular: negative for chest pain.   · Gastrointestinal: negative abdominal pain.   · Neurological: negative for LOC  · Psychiatric/Behavioral: negative for memory loss.   · All other systems reviewed and are  negative    VITALS: /97 (BP Location: Left arm, Patient Position: Sitting)   Pulse 68   Temp 97.6 °F (36.4 °C) (Oral)   Resp 16   LMP  (LMP Unknown)   SpO2 99%  There is no height or weight on file to calculate BMI.    PHYSICAL EXAM:   · CONSTITUTIONAL: A&Ox3, No acute distress  · LUNGS: Equal chest rise, no shortness of air  · CARDIOVASCULAR: palpable peripheral pulses  · SKIN: no skin lesions in the area examined  · LYMPH: no lymphadenopathy in the area examined  · EXTREMITY: Hip  · Pulses:  Brisk Capillary Refill  · Sensation: Intact to Saphenous, Sural, Deep Peroneal, Superficial Peroneal, and Tibial Nerves and grossly throughout extremity  · Motor: 5/5 EHL/FHL/TA/GS motor complexes    RADIOLOGY REVIEW:   No radiology results for the last 7 days    LABS:   Results for the past 24 hours: No results found for this or any previous visit (from the past 24 hour(s)).    IMPRESSION:  Patient is a 77 y.o. Not  or  female with end-stage osteoarthritis of the left hip    PLAN:   · Surgery: Elective total hip arthroplasty  · Consent: The risks and benefits of operative versus nonoperative treatment were discussed. The patient elected to undergo operative treatment of their hip. The risks discussed included but were not limited to blood clots, MI, stroke, other medical complications, infection, dislocation, fracture, damage to neurovascular structures, continued pain, hardware prominence, loss of range of motion, stiffness, need for further procedures, and and risk of anesthesia. No guarantees were made   · Disposition: Elective left Total Hip Arthroplasty today.    Israel Carcamo II, MD  Orthopaedic Surgery  [facility]

## 2023-03-17 NOTE — THERAPY EVALUATION
Patient Name: Debo Blair  : 1945    MRN: [id number]                              Today's Date: 3/17/2023       Admit Date: 3/17/2023    Visit Dx:     ICD-10-CM ICD-9-CM   1. Status post left hip replacement  Z96.642 V43.64     Patient Active Problem List   Diagnosis   • Adenomatous colon polyp   • History of adenomatous polyp of colon   • Adenomatous polyp of colon   • Hip joint replacement status     Past Medical History:   Diagnosis Date   • Anxiety    • Chest pain syndrome     FOLLOWED BY DR [facility]   • Coronary artery disease 2022   • Diverticulosis    • Fibrocystic breast changes    • GERD (gastroesophageal reflux disease)    • History of colon polyps    • Hyperlipidemia    • Hypertension    • IBS (irritable bowel syndrome)    • Lactose intolerance    • Lichen sclerosus    • Melanosis coli    • Nocturia    • Osteoarthritis    • Osteoporosis    • Palpitations    • Paralytic ileus (HCC)    • PONV (postoperative nausea and vomiting)      Past Surgical History:   Procedure Laterality Date   • ANAL FISSURECTOMY     • CHOLECYSTECTOMY     • COLON RESECTION N/A 2016    Procedure: COLON RESECTION LAPAROSCOPIC LEFT MINDY COLECTOMY ;  Surgeon: Nikki Smith MD;  Location: [facility] OR;  Service:    • COLONOSCOPY  2016    polyps, NBIH, tubular adenoma w/low grade dysplasia   • COLONOSCOPY N/A 2018    Procedure: COLONOSCOPY TO CECUM AND INTO TERMINAL ILEUM WITH HOT SNARE POLYPECTOMY,  5 ML SALINE LIFT INJECTION, 1 ML TATTOO INK INJECTION, RESOLUTION CLIP X1 PLACEMENT, AND APC CAUTERY TO TRANSVERSE COLON POLYP SITE;  Surgeon: Luna Becerra MD;  Location: [facility] ENDOSCOPY;  Service: Gastroenterology   • COLONOSCOPY N/A 2021    Procedure: COLONOSCOPY to anastamosis and cecum with cold/hot snare polypectomies;  Surgeon: Luna Becerra MD;  Location: [facility] ENDOSCOPY;  Service: Gastroenterology;  Laterality: N/A;  Pre: h/x of colon  polyps  Post: diverticulosis, polyps, left sided anastamosis, hemorrhoids   • HYSTERECTOMY     • SKIN TAG REMOVAL  2004      General Information     Row Name 03/17/23 1551          Physical Therapy Time and Intention    Document Type evaluation  -     Mode of Treatment individual therapy;physical therapy  -     Row Name 03/17/23 1556          General Information    Patient Profile Reviewed yes  -     Prior Level of Function independent:  -     Existing Precautions/Restrictions fall  -     Row Name 03/17/23 1554          Living Environment    People in Home alone  -     Row Name 03/17/23 1554          Home Main Entrance    Number of Stairs, Main Entrance three  -     Row Name 03/17/23 1554          Cognition    Orientation Status (Cognition) oriented x 4  -     Row Name 03/17/23 1559          Safety Issues, Functional Mobility    Impairments Affecting Function (Mobility) strength;endurance/activity tolerance;pain  -           User Key  (r) = Recorded By, (t) = Taken By, (c) = Cosigned By    Initials Name Provider Type     Vivian Zurita PT Physical Therapist               Mobility     Row Name 03/17/23 1552          Bed Mobility    Bed Mobility supine-sit  -     Supine-Sit [place] (Bed Mobility) standby assist  -     Assistive Device (Bed Mobility) head of bed elevated  -     Row Name 03/17/23 1557          Sit-Stand Transfer    Sit-Stand [place] (Transfers) contact guard;verbal cues  -     Assistive Device (Sit-Stand Transfers) walker, front-wheeled  -     Comment, (Sit-Stand Transfer) VCs for hand placement  -     Row Name 03/17/23 1556          Gait/Stairs (Locomotion)    [place] Level (Gait) contact guard;minimum assist (75% patient effort)  -     Assistive Device (Gait) walker, front-wheeled  -     Distance in Feet (Gait) 15ft  -     Deviations/Abnormal Patterns (Gait) gait speed decreased;antalgic  -     Left Sided Gait Deviations knee buckling, left  side  -     [place] Level (Stairs) not tested  -           User Key  (r) = Recorded By, (t) = Taken By, (c) = Cosigned By    Initials Name Provider Type     Vivian Zurita PT Physical Therapist               Obj/Interventions     Row Name 03/17/23 1556          Range of Motion Comprehensive    General Range of Motion bilateral lower extremity ROM WFL  -[facility] Name 03/17/23 1556          Strength Comprehensive (MMT)    General Manual Muscle Testing (MMT) Assessment lower extremity strength deficits identified  -     Comment, General Manual Muscle Testing (MMT) Assessment Generalized post op weakness  -     Row Name 03/17/23 1556          Balance    Balance Assessment sitting static balance;sitting dynamic balance;sit to stand dynamic balance;standing static balance;standing dynamic balance  -     Static Sitting Balance standby assist  -     Dynamic Sitting Balance standby assist  -     Position, Sitting Balance sitting edge of bed  -     Sit to Stand Dynamic Balance contact guard  -     Static Standing Balance contact guard  -     Dynamic Standing Balance contact guard;minimal assist  -     Position/Device Used, Standing Balance supported;walker, front-wheeled  -     Balance Interventions sitting;standing;sit to stand;supported;static;dynamic  -           User Key  (r) = Recorded By, (t) = Taken By, (c) = Cosigned By    Initials Name Provider Type     Vivian Zurita PT Physical Therapist               Goals/Plan     [facility] Name 03/17/23 1600          Bed Mobility Goal 1 (PT)    Activity/Assistive Device (Bed Mobility Goal 1, PT) bed mobility activities, all  -SM     [place] Level/Cues Needed (Bed Mobility Goal 1, PT) standby assist  -     Time Frame (Bed Mobility Goal 1, PT) 1 week  -[facility] Name 03/17/23 1600          Transfer Goal 1 (PT)    Activity/Assistive Device (Transfer Goal 1, PT) sit-to-stand/stand-to-sit;bed-to-chair/chair-to-bed  -     [place]  Level/Cues Needed (Transfer Goal 1, PT) standby assist  -SM     Time Frame (Transfer Goal 1, PT) 1 week  -     Row Name 03/17/23 1600          Gait Training Goal 1 (PT)    Activity/Assistive Device (Gait Training Goal 1, PT) gait (walking locomotion);walker, rolling  -SM     [place] Level (Gait Training Goal 1, PT) standby assist  -SM     Distance (Gait Training Goal 1, PT) 150ft  -SM     Time Frame (Gait Training Goal 1, PT) 1 week  -SM           User Key  (r) = Recorded By, (t) = Taken By, (c) = Cosigned By    Initials Name Provider Type    SM Vivian Zurita, PT Physical Therapist               Clinical Impression     Row Name 03/17/23 1835          Pain    Pretreatment Pain Rating 0/10 - no pain  -SM     Posttreatment Pain Rating 0/10 - no pain  -SM     Row Name 03/17/23 8540          Plan of Care Review    Plan of Care Reviewed With patient  -SM     Outcome Evaluation Patient is a 77 y.o female who presents POD0 L JESSY anterior approach. Patient AOx4 supine in bed upon arrival. Patient lives alone with 3 BENY. Patient reports her friend will stay with her at d/c and her son will help out as needed. Patient reports need for rwx at d/c. Patient sat up to EOB with CGA this date. Patient performed STS from EOB with CGA and VCs for hand placement. Patient ambulated 15ft with rwx and CGA-Jeramie. Gait slow and antalgic with L knee buckling. Reviewed JESSY post op protocol with patient. Patient UIC at end of session. Patient would continue to benefit from skilled PT intervention to address deficits in functional mobility and maximize safety and independence. Anticipate home with assist and HHPT at d/c. PT will continue to monitor.  -     Row Name 03/17/23 0843          Therapy Assessment/Plan (PT)    Rehab Potential (PT) good, to achieve stated therapy goals  -     Criteria for Skilled Interventions Met (PT) yes  -SM     Therapy Frequency (PT) daily  -     Row Name 03/17/23 9685          Vital Signs    O2  Delivery Pre Treatment room air  -SM     O2 Delivery Intra Treatment room air  -SM     O2 Delivery Post Treatment room air  -SM     Pre Patient Position Supine  -SM     Intra Patient Position Standing  -SM     Post Patient Position Sitting  -SM     Row Name 03/17/23 1557          Positioning and Restraints    Pre-Treatment Position in bed  -SM     Post Treatment Position chair  -SM     In Chair reclined;call light within reach;encouraged to call for assist;exit alarm on;notified nsg  -           User Key  (r) = Recorded By, (t) = Taken By, (c) = Cosigned By    Initials Name Provider Type    Vivian Chavarria, TAMIKA Physical Therapist               Outcome Measures     Row Name 03/17/23 1601 03/17/23 1326       How much help from another person do you currently need...    Turning from your back to your side while in flat bed without using bedrails? 3  -SM 3  -MN    Moving from lying on back to sitting on the side of a flat bed without bedrails? 3  - 3  -MN    Moving to and from a bed to a chair (including a wheelchair)? 3  - 3  -MN    Standing up from a chair using your arms (e.g., wheelchair, bedside chair)? 3  - 3  -MN    Climbing 3-5 steps with a railing? 2  - 2  -MN    To walk in hospital room? 3  -SM 3  -MN    AM-PAC 6 Clicks Score (PT) 17  - 17  -MN    Highest level of mobility 5 --> Static standing  - 5 --> Static standing  -MN    Row Name 03/17/23 1601          Functional Assessment    Outcome Measure Options AM-PAC 6 Clicks Basic Mobility (PT)  -           User Key  (r) = Recorded By, (t) = Taken By, (c) = Cosigned By    Initials Name Provider Type    Iona Serra RN Registered Nurse    Vivian Chavarria, PT Physical Therapist                             Physical Therapy Education     Title: PT OT SLP Therapies (Done)     Topic: Physical Therapy (Done)     Point: Mobility training (Done)     Learning Progress Summary           Patient Acceptance, E, VU by ALFRED at 3/17/2023 1602                    Point: Home exercise program (Done)     Learning Progress Summary           Patient Acceptance, E, VU by  at 3/17/2023 1602                   Point: Body mechanics (Done)     Learning Progress Summary           Patient Acceptance, E, VU by  at 3/17/2023 1602                   Point: Precautions (Done)     Learning Progress Summary           Patient Acceptance, E, VU by  at 3/17/2023 1602                               User Key     Initials Effective Dates Name Provider Type Discipline     05/02/22 -  Vivian Zurita, PT Physical Therapist PT              PT Recommendation and Plan     Plan of Care Reviewed With: patient  Outcome Evaluation: Patient is a 77 y.o female who presents POD0 L EJSSY anterior approach. Patient AOx4 supine in bed upon arrival. Patient lives alone with 3 BENY. Patient reports her friend will stay with her at d/c and her son will help out as needed. Patient reports need for rwx at d/c. Patient sat up to EOB with CGA this date. Patient performed STS from EOB with CGA and VCs for hand placement. Patient ambulated 15ft with rwx and CGA-Jeramie. Gait slow and antalgic with L knee buckling. Reviewed JESSY post op protocol with patient. Patient UIC at end of session. Patient would continue to benefit from skilled PT intervention to address deficits in functional mobility and maximize safety and independence. Anticipate home with assist and HHPT at d/c. PT will continue to monitor.     Time Calculation:    PT Charges     Row Name 03/17/23 1603             Time Calculation    Start Time 1540  -SM      Stop Time 1551  -      Time Calculation (min) 11 min  -SM      PT Received On 03/17/23  -      PT - Next Appointment 03/18/23  -      PT Goal Re-Cert Due Date 03/24/23  -         Time Calculation- PT    Total Timed Code Minutes- PT 8 minute(s)  -SM         Timed Charges    70174 - PT Therapeutic Exercise Minutes 8  -SM         Total Minutes    Timed Charges Total Minutes 8  -SM        Total Minutes 8  -SM            User Key  (r) = Recorded By, (t) = Taken By, (c) = Cosigned By    Initials Name Provider Type     Vivian Zurita, PT Physical Therapist              Therapy Charges for Today     Code Description Service Date Service Provider Modifiers Qty    [id number]  PT THER PROC EA 15 MIN 3/17/2023 Vivian Zurita, PT  1    [id number] HC PT EVAL LOW COMPLEXITY 3 3/17/2023 Vivian Zurita, PT  1          PT G-Codes  Outcome Measure Options: AM-PAC 6 Clicks Basic Mobility (PT)  AM-PAC 6 Clicks Score (PT): 17  PT Discharge Summary  Anticipated Discharge Disposition (PT): home with home health, home with assist  Patient was not wearing a face mask during this therapy encounter. Therapist used appropriate personal protective equipment including mask and gloves.  Mask used was standard procedure mask. Appropriate PPE was worn during the entire therapy session. Hand hygiene was completed before and after therapy session. Patient is not in enhanced droplet precautions.     Vivian Zurita PT  3/17/2023

## 2023-03-17 NOTE — PROGRESS NOTES
Continued Stay Note  [facility]     Patient Name: Debo Blair  MRN: [id number]  Today's Date: 3/17/2023    Admit Date: 3/17/2023    Plan: [facility]   Discharge Plan     Row Name 03/17/23 1636       Plan    Plan [facility]    Patient/Family in Agreement with Plan yes    Plan Comments Spoke with pt, verified correct information on facesheet and explained the role of CCP. Pt would like to d/c home with [facility], referral sent in Epic to [facility]. Plan will be to d/c home with [facility] and family support. No other needs identified.               Discharge Codes    No documentation.                     Giovana Walter RN

## 2023-03-17 NOTE — OP NOTE
Anterior Total Hip Operative Note  Dr. DANIEL Carcamo II  [phone]    PATIENT NAME: Debo Blair  MRN: [id number]  : 1945 AGE: 77 y.o. GENDER: female  DATE OF OPERATION: 3/17/2023  PREOPERATIVE DIAGNOSIS: End stage Osteoarthritis  POSTOPERATIVE DIAGNOSIS: Same  OPERATION PERFORMED: Left Anterior Total Hip Arthroplasty  SURGEON: Israel Carcamo MD  Circulator: Ingrid Quiroga RN; Virginia Ramires RN  Radiology Technologist: Aleshia Trammell  Scrub Person: Jacki Palmer  Vendor Representative: Arnoldo Smith  Orderly: Petrona Liu  Assistant: To Pittman CSA  ANESTHESIA: General  ASSISTANT: Ponce Pittman. This case would not have been possible without another set of skilled surgical hands for retraction, use of instrumentation, and general assistance.  This assistance was vital to the success of the case.   ESTIMATED BLOOD LOSS: 150cc  SPONGE AND NEEDLE COUNT: Correct  INDICATIONS:  Arthritis: This patient was noted to have severe arthritis affecting the operative hip. They failed conservative management and elected to undergo total hip replacement. A discussion of operative versus nonoperative treatment was had. They elected to undergo anterior total hip arthroplasty. The risks of surgery were discussed and included the risk of anesthesia, infection, damage to neurovascular structures, implant loosening/failure, fracture, hardware prominence, dislocation, the need for further procedures, medical complications, and others. No guarantees were made. The patient wished to proceed with surgery and a surgical consent was signed.  COMPONENTS:   · Acetabular Cup: Smith [employer] Nephew R3 acetabular cup: 54 Outer Diameter  · Cup Screws: Smith [employer] Nephew 6.5 mm screws: No Screws Were Used  · [employer]hew Neutral Acetabular Liner: Neutral  · [employer] Nephew Polar Femoral Stem: Size 3  · [employer]hew Oxinium Head: 36mm +4    PERTINENT FINDINGS: Arthritis: Endstage degenerative arthritis of the femoral head and  acetabulum.    DETAILS OF PROCEDURE:   The patient was met in the preoperative area. The site was marked. The consent and H&P were reviewed. The patient was then wheeled back to the operative suite underwent anesthesia. The [place] table boots were secured to the patients’ feet. The patient was moved onto the [place] table and secured in the supine position. The perineal post was inserted and the boots were secured into the leg holders. Surgical alcohol was used to thoroughly clean the operative area.     The hip and leg was then prepped in the normal sterile fashion, multiple layers of sterile drapes, and surgical space suits for the entire operative team. New outer gloves were used by all sterile surgical team members after final draping. The surgical incision was marked. A surgical timeout was performed.    A Modified Pascual-Aguiar anterior approach was used. Dissection was carried down to the fascia. The fascia was incised and the tensor fascia marnie muscle was retracted laterally and the sartorius medially. The lateral femoral circumflex vessels were identified and cauterized using bovie. The rectus femoris was retracted medially. A capsulotomy was then performed. The capsule was tagged with Ethibond for later repair. Retractors were placed on either side of the femoral neck and dissection was further carried down so that the lesser trochanter could be palpated and the superior rim of the acetabulum could be visualized.    The femoral neck cut was then made from  just proximal to the lesser trochanter medially headed towards the saddle laterally. Care was taken not to extend the cut into the lesser or greater trochanters. The head and neck segment were removed with a corkscrew. The acetabulum was then exposed. The labrum was removed using a kocher and scalpel and excess osteophytes were removed using an osteotome.    The acetabulum was progressively reamed, beginning with medialization and then finalizing the  position of the reamer to approximate the final cup position. Fluoroscopy was used to ensure proper placement of the reamer, including adequate medialization as well as appropriate abduction and anteversion. The real cup was then opened and inserted using fluoroscopy, ensuring good position in terms of abduction and anteversion.     After thorough irrigation and ensuring that no soft tissue was entrapped within the cup, the real liner was snapped into place.    The hook was placed just distal to the greater trochanter. The femur was then externally rotated, extended and adducted under the well leg. Soft tissue releases were performed to gain exposure to the proximal femur. Capsule was released from the saddle and the lateral femur. Care was taken to preserve the short external rotator tendons. The capsule was also released along the medial femur. The hydraulic femoral lift was then used to better expose the femur. Further soft tissue releases were then performed, again ensuring preservation of the short external rotators.    The femur was machined with a cookie cutter osteotome and then a rasp was used to further lateralize the starting point. The sclerotic bone on the lateral shoulder of the femur was removed with a rongeour and curette as needed to protect the greater trochanter. Progressive broaches were inserted until adequate fill had been achieved. Using fluoroscopy, the femoral stem was visualized after trial reduction of the hip. The length and offset were compared to the non-operative hip. Trials of stem size and neck length were trialed until equal leg length and offset were obtained. Additionally hip stability was tested with internal and external rotation of the leg. The leg was stable with at least 90° of external rotation and there was no impingement at 60° of internal rotation. After implantation of the final stem and ball, the leg was once again brought into normal anatomic position and relocated.  "Final x-rays were taken with final implants noting good position of the stem and cup, and no visualized fracture.. The hip was stable upon reduction.    No Prophylactic Cable: Before final reduction of the hip, the proximal femur and femoral calcar was thoroughly visualized to ensure that there was no fracture/crack. The bone quality was thought to be sufficient enough that no prophylactic cable was needed for this case.    An analgesic cocktail was then injected about the hip as well as the surgical dissection area. The capsule was closed.  The fascia was then closed with a running stitch and the skin was closed in layers.  A sterile dressing was applied.    The patient was moved from the [place] table to the [facility] where the boots were removed. The patient was taken to the recovery room in stable condition. There were no complications and the patient tolerated the procedure well.    R \"Pawan\" Dona STEIN MD  Orthopaedic Surgery  [facility]  [phone]              "

## 2023-03-18 LAB
HCT VFR BLD AUTO: 31.2 % (ref 34–46.6)
HGB BLD-MCNC: 10.9 G/DL (ref 12–15.9)

## 2023-03-18 PROCEDURE — 63710000001 OXYCODONE-ACETAMINOPHEN 5-325 MG TABLET: Performed by: ORTHOPAEDIC SURGERY

## 2023-03-18 PROCEDURE — 63710000001 VALSARTAN 160 MG TABLET: Performed by: ORTHOPAEDIC SURGERY

## 2023-03-18 PROCEDURE — 63710000001 ALPRAZOLAM 0.25 MG TABLET: Performed by: ORTHOPAEDIC SURGERY

## 2023-03-18 PROCEDURE — 63710000001 DOCUSATE SODIUM 100 MG CAPSULE: Performed by: ORTHOPAEDIC SURGERY

## 2023-03-18 PROCEDURE — 63710000001 FAMOTIDINE 20 MG TABLET: Performed by: ORTHOPAEDIC SURGERY

## 2023-03-18 PROCEDURE — A9270 NON-COVERED ITEM OR SERVICE: HCPCS | Performed by: NURSE PRACTITIONER

## 2023-03-18 PROCEDURE — 97110 THERAPEUTIC EXERCISES: CPT

## 2023-03-18 PROCEDURE — A9270 NON-COVERED ITEM OR SERVICE: HCPCS | Performed by: ORTHOPAEDIC SURGERY

## 2023-03-18 PROCEDURE — 63710000001 MELOXICAM 15 MG TABLET: Performed by: ORTHOPAEDIC SURGERY

## 2023-03-18 PROCEDURE — 63710000001 ASPIRIN 81 MG TABLET DELAYED-RELEASE: Performed by: ORTHOPAEDIC SURGERY

## 2023-03-18 PROCEDURE — G0378 HOSPITAL OBSERVATION PER HR: HCPCS

## 2023-03-18 PROCEDURE — 63710000001 POLYETHYLENE GLYCOL 17 G PACK: Performed by: NURSE PRACTITIONER

## 2023-03-18 PROCEDURE — 63710000001 METOPROLOL SUCCINATE XL 25 MG TABLET SUSTAINED-RELEASE 24 HOUR: Performed by: ORTHOPAEDIC SURGERY

## 2023-03-18 PROCEDURE — 85018 HEMOGLOBIN: CPT | Performed by: ORTHOPAEDIC SURGERY

## 2023-03-18 PROCEDURE — 85014 HEMATOCRIT: CPT | Performed by: ORTHOPAEDIC SURGERY

## 2023-03-18 RX ORDER — POLYETHYLENE GLYCOL 3350 17 G/17G
17 POWDER, FOR SOLUTION ORAL 2 TIMES DAILY
Status: DISCONTINUED | OUTPATIENT
Start: 2023-03-18 | End: 2023-03-19 | Stop reason: HOSPADM

## 2023-03-18 RX ADMIN — METOPROLOL SUCCINATE 25 MG: 25 TABLET, EXTENDED RELEASE ORAL at 18:09

## 2023-03-18 RX ADMIN — ASPIRIN 81 MG: 81 TABLET, COATED ORAL at 20:23

## 2023-03-18 RX ADMIN — MELOXICAM 15 MG: 15 TABLET ORAL at 08:47

## 2023-03-18 RX ADMIN — ASPIRIN 81 MG: 81 TABLET, COATED ORAL at 08:46

## 2023-03-18 RX ADMIN — CLINDAMYCIN PHOSPHATE 900 MG: 900 INJECTION, SOLUTION INTRAVENOUS at 02:16

## 2023-03-18 RX ADMIN — FAMOTIDINE 40 MG: 20 TABLET, FILM COATED ORAL at 08:46

## 2023-03-18 RX ADMIN — ALPRAZOLAM 0.5 MG: 0.25 TABLET ORAL at 20:23

## 2023-03-18 RX ADMIN — POLYETHYLENE GLYCOL 3350 17 G: 17 POWDER, FOR SOLUTION ORAL at 20:23

## 2023-03-18 RX ADMIN — VALSARTAN 160 MG: 160 TABLET, FILM COATED ORAL at 06:16

## 2023-03-18 RX ADMIN — OXYCODONE AND ACETAMINOPHEN 1 TABLET: 5; 325 TABLET ORAL at 12:20

## 2023-03-18 RX ADMIN — DOCUSATE SODIUM 100 MG: 100 CAPSULE, LIQUID FILLED ORAL at 20:23

## 2023-03-18 NOTE — PLAN OF CARE
Goal Outcome Evaluation:   Patient is Aox4, VSS, able to ambulate to bathroom assist x1 w/walker. MILTON dressing remains CDI. Pain has been managed well with PRN medication. Plans to DC tomorrow (3/19/23). Will continue to monitor.

## 2023-03-18 NOTE — PROGRESS NOTES
"/61 (BP Location: Right arm, Patient Position: Lying)   Pulse 53   Temp 97.9 °F (36.6 °C) (Oral)   Resp 16   Ht 170.2 cm (67\")   Wt 62.6 kg (138 lb)   LMP  (LMP Unknown)   SpO2 97%   BMI 21.61 kg/m²     Results from last 7 days   Lab Units 03/18/23  0432   HEMOGLOBIN g/dL 10.9*   HEMATOCRIT % 31.2*       Results from last 7 days   Lab Units 03/17/23  1445   SODIUM mmol/L 134*   POTASSIUM mmol/L 4.6   CHLORIDE mmol/L 100   CO2 mmol/L 25.1   BUN mg/dL 10   CREATININE mg/dL 0.77   GLUCOSE mg/dL 119*   CALCIUM mg/dL 8.7       Imaging Results (Last 24 Hours)     Procedure Component Value Units Date/Time    XR Pelvis 1 or 2 View [[id number]] Collected: 03/17/23 1247     Updated: 03/17/23 1251    Narrative:      XR PELVIS 1 OR 2 VW-     INDICATIONS: Postoperative evaluation.     TECHNIQUE: Frontal views of the lower pelvis and left hip     COMPARISON: 03/06/2023     FINDINGS:      Intact appearing left hip arthroplasty hardware is seen with adjacent  surgical soft tissue gas. No acute fracture is identified.          Impression:         Postsurgical changes.           This report was finalized on 3/17/2023 12:47 PM by Dr. Manan Lee M.D.       XR Hip With or Without Pelvis 1 View Left [[id number]] Collected: 03/17/23 1200     Updated: 03/17/23 1204    Narrative:      XR HIP W OR WO PELVIS 1 VIEW LEFT-, FL C ARM DURING SURGERY-     INDICATIONS: Left hip arthroplasty     TECHNIQUE: FLUOROSCOPIC ASSISTANCE IN THE OPERATING ROOM.     FINDINGS:     2 intraoperative fluoroscopic spot views were obtained and recorded the  PACS for review, revealing left hip arthroplasty. Please see operative  report for full details.     Fluoroscopy time: 17.0 seconds       Impression:         As described.     This report was finalized on 3/17/2023 12:01 PM by Dr. Manan Lee M.D.       FL C Arm During Surgery [[id number]] Collected: 03/17/23 1200     Updated: 03/17/23 1204    Narrative:      XR HIP W OR WO PELVIS 1 " VIEW LEFT-, FL C ARM DURING SURGERY-     INDICATIONS: Left hip arthroplasty     TECHNIQUE: FLUOROSCOPIC ASSISTANCE IN THE OPERATING ROOM.     FINDINGS:     2 intraoperative fluoroscopic spot views were obtained and recorded the  PACS for review, revealing left hip arthroplasty. Please see operative  report for full details.     Fluoroscopy time: 17.0 seconds       Impression:         As described.     This report was finalized on 3/17/2023 12:01 PM by Dr. Manan Lee M.D.             Patient Care Team:  Olivia Peres MD as PCP - General (Family Medicine)  Kammerling, James M, MD as Consulting Physician (Cardiac Electrophysiology)    SUBJECTIVE  Doing ok.  anxious  PHYSICAL EXAM  Hip looks good.  nv intact     Hip joint replacement status    Moderate malnutrition (HCC)      PLAN / DISPOSITION:  D/C with  pending her PT session this am  NIKITA Drew  03/18/23  08:59 EDT

## 2023-03-18 NOTE — THERAPY TREATMENT NOTE
Patient Name: Debo Blair  : 1945    MRN: [id number]                              Today's Date: 3/18/2023       Admit Date: 3/17/2023    Visit Dx:     ICD-10-CM ICD-9-CM   1. Status post left hip replacement  Z96.642 V43.64     Patient Active Problem List   Diagnosis   • Adenomatous colon polyp   • History of adenomatous polyp of colon   • Adenomatous polyp of colon   • Hip joint replacement status   • Moderate malnutrition (HCC)     Past Medical History:   Diagnosis Date   • Anxiety    • Chest pain syndrome     FOLLOWED BY DR [facility]   • Coronary artery disease 2022   • Diverticulosis    • Fibrocystic breast changes    • GERD (gastroesophageal reflux disease)    • History of colon polyps    • Hyperlipidemia    • Hypertension    • IBS (irritable bowel syndrome)    • Lactose intolerance    • Lichen sclerosus    • Melanosis coli    • Nocturia    • Osteoarthritis    • Osteoporosis    • Palpitations    • Paralytic ileus (HCC)    • PONV (postoperative nausea and vomiting)      Past Surgical History:   Procedure Laterality Date   • ANAL FISSURECTOMY     • CHOLECYSTECTOMY     • COLON RESECTION N/A 2016    Procedure: COLON RESECTION LAPAROSCOPIC LEFT MINDY COLECTOMY ;  Surgeon: Nikki Smith MD;  Location: [facility] OR;  Service:    • COLONOSCOPY  2016    polyps, NBIH, tubular adenoma w/low grade dysplasia   • COLONOSCOPY N/A 2018    Procedure: COLONOSCOPY TO CECUM AND INTO TERMINAL ILEUM WITH HOT SNARE POLYPECTOMY,  5 ML SALINE LIFT INJECTION, 1 ML TATTOO INK INJECTION, RESOLUTION CLIP X1 PLACEMENT, AND APC CAUTERY TO TRANSVERSE COLON POLYP SITE;  Surgeon: Luna Becerra MD;  Location: [facility] ENDOSCOPY;  Service: Gastroenterology   • COLONOSCOPY N/A 2021    Procedure: COLONOSCOPY to anastamosis and cecum with cold/hot snare polypectomies;  Surgeon: Luna Becerra MD;  Location: [facility] ENDOSCOPY;  Service: Gastroenterology;  Laterality:  N/A;  Pre: h/x of colon polyps  Post: diverticulosis, polyps, left sided anastamosis, hemorrhoids   • HYSTERECTOMY     • SKIN TAG REMOVAL  2004      General Information     [facility] Name 03/18/23 1622          Physical Therapy Time and Intention    Document Type therapy note (daily note)  -     Mode of Treatment individual therapy;physical therapy  -JM     Row Name 03/18/23 1622          General Information    Patient Profile Reviewed other (see comments)  -     Existing Precautions/Restrictions fall;hip, anterior;left  -JM     Row Name 03/18/23 1622          Living Environment    People in Home alone  but friends and family to assist  -JM     Row Name 03/18/23 1622          Home Main Entrance    Number of Stairs, Main Entrance three  -     Stair Railings, Main Entrance railings on both sides of stairs;railings safe and in good condition  -JM     Row Name 03/18/23 1622          Cognition    Orientation Status (Cognition) oriented x 4  but extremely anxious  -JM     Row Name 03/18/23 1622          Safety Issues, Functional Mobility    Safety Issues Affecting Function (Mobility) judgment;problem-solving;safety precaution awareness  -     Impairments Affecting Function (Mobility) endurance/activity tolerance;strength  -           User Key  (r) = Recorded By, (t) = Taken By, (c) = Cosigned By    Initials Name Provider Type     Gabriela Masters PTA Physical Therapist Assistant               Mobility     Row Name 03/18/23 1623          Bed Mobility    Supine-Sit Fitzpatrick (Bed Mobility) contact guard;verbal cues  -JM     Row Name 03/18/23 1623          Sit-Stand Transfer    Sit-Stand Fitzpatrick (Transfers) contact guard;verbal cues  -     Assistive Device (Sit-Stand Transfers) walker, front-wheeled  -JM     Row Name 03/18/23 1623          Gait/Stairs (Locomotion)    Fitzpatrick Level (Gait) contact guard;standby assist;verbal cues  -     Assistive Device (Gait) walker, front-wheeled  -     Distance in  Feet (Gait) 100ft x2-required seated rest  -     Deviations/Abnormal Patterns (Gait) antalgic;deirdre decreased;base of support, narrow;stride length decreased  -     Bilateral Gait Deviations forward flexed posture  corrected w/cues  -     Comment, (Gait/Stairs) very apprehensive about everything, upset as she had a LOB in [place] earlier today that scared her, she did not fall  -           User Key  (r) = Recorded By, (t) = Taken By, (c) = Cosigned By    Initials Name Provider Type    Gabriela Lamb PTA Physical Therapist Assistant               Obj/Interventions     Row Name 03/18/23 1626          Motor Skills    Therapeutic Exercise --  THR protocol x10 reps  -           User Key  (r) = Recorded By, (t) = Taken By, (c) = Cosigned By    Initials Name Provider Type    Gabriela Lamb PTA Physical Therapist Assistant               Goals/Plan    No documentation.                Clinical Impression     Row Name 03/18/23 1626          Pain    Pretreatment Pain Rating 5/10  -JM     Posttreatment Pain Rating 6/10  -     Pain Location - Side/Orientation Left  -     Pain Location - hip  -     Pain Intervention(s) Repositioned;Ambulation/increased activity;Elevated;Cold applied;Medication (See MAR)  -     Row Name 03/18/23 1626          Plan of Care Review    Plan of Care Reviewed With patient;son;friend  son and friend are going to assist at home  -     Progress improving  -     Outcome Evaluation Pt agreed to PT session, pt apprehensive and emotional upon entry; had been to [place] earlier, but had LOB , now fearful of falling; encouraged pt and she agreed to try amb; nano amb 100ft x2, seated rest , nano hip protocol x10 reps, pt with lengthy stair training educ , son present, pt nano stairs x3 w/bilat HR and 1 step backward w/wx to practice over threshold, will F/U with any educ to ensure safety at home, pt wants to stay another day per nsg after LOB and scare  -     Row Name 03/18/23 1626           Therapy Assessment/Plan (PT)    Rehab Potential (PT) good, to achieve stated therapy goals  -     Criteria for Skilled Interventions Met (PT) yes  -     Row Name 03/18/23 1626          Vital Signs    O2 Delivery Pre Treatment room air  -     Row Name 03/18/23 1626          Positioning and Restraints    Pre-Treatment Position in bed  -     Post Treatment Position chair  -     In Chair reclined;call light within reach;encouraged to call for assist;exit alarm on;with family/caregiver;notified nsg  -           User Key  (r) = Recorded By, (t) = Taken By, (c) = Cosigned By    Initials Name Provider Type    Gabriela Lamb PTA Physical Therapist Assistant               Outcome Measures     Row Name 03/18/23 1632 03/18/23 0846       How much help from another person do you currently need...    Turning from your back to your side while in flat bed without using bedrails? 3  - 3  -DEREJE    Moving from lying on back to sitting on the side of a flat bed without bedrails? 3  - 3  -DEREJE    Moving to and from a bed to a chair (including a wheelchair)? 3  - 3  -DEREJE    Standing up from a chair using your arms (e.g., wheelchair, bedside chair)? 3  - 3  -DEREJE    Climbing 3-5 steps with a railing? 3  - 2  -DEREJE    To walk in hospital room? 3  - 3  -DEREJE    AM-PAC 6 Clicks Score (PT) 18  - 17  -    Highest level of mobility 6 --> Walked 10 steps or more  - 5 --> Static standing  -          User Key  (r) = Recorded By, (t) = Taken By, (c) = Cosigned By    Initials Name Provider Type    Gabriela Lamb PTA Physical Therapist Assistant    Moses Bower, RN Registered Nurse                             Physical Therapy Education     Title: PT OT SLP Therapies (Done)     Topic: Physical Therapy (Done)     Point: Mobility training (Done)     Learning Progress Summary           Patient Acceptance, E,TB,D, VU,NR by NURY at 3/18/2023 1632    Acceptance, E, VU by ALFRED at 3/17/2023 1602   Family Acceptance, E,TB,D,  VU,NR by  at 3/18/2023 1632                   Point: Home exercise program (Done)     Learning Progress Summary           Patient Acceptance, E,TB,D, VU,NR by  at 3/18/2023 1632    Acceptance, E, VU by  at 3/17/2023 1602   Family Acceptance, E,TB,D, VU,NR by  at 3/18/2023 1632                   Point: Body mechanics (Done)     Learning Progress Summary           Patient Acceptance, E,TB,D, VU,NR by  at 3/18/2023 1632    Acceptance, E, VU by  at 3/17/2023 1602   Family Acceptance, E,TB,D, VU,NR by  at 3/18/2023 1632                   Point: Precautions (Done)     Learning Progress Summary           Patient Acceptance, E,TB,D, VU,NR by  at 3/18/2023 1632    Acceptance, E, VU by  at 3/17/2023 1602   Family Acceptance, E,TB,D, VU,NR by  at 3/18/2023 1632                               User Key     Initials Effective Dates Name Provider Type Discipline     03/07/18 -  Gabriela Masters, PTA Physical Therapist Assistant PT     05/02/22 -  Vivian Zurita, PT Physical Therapist PT              PT Recommendation and Plan     Plan of Care Reviewed With: patient, son, friend (son and friend are going to assist at home)  Progress: improving  Outcome Evaluation: Pt agreed to PT session, pt apprehensive and emotional upon entry; had been to [place] earlier, but had LOB , now fearful of falling; encouraged pt and she agreed to try amb; nano amb 100ft x2, seated rest , nano hip protocol x10 reps, pt with lengthy stair training educ , son present, pt nano stairs x3 w/bilat HR and 1 step backward w/wx to practice over threshold, will F/U with any educ to ensure safety at home, pt wants to stay another day per nsg after LOB and scare     Time Calculation:    PT Charges     Row Name 03/18/23 1633             Time Calculation    Start Time 1201  -      Stop Time 1257  -      Time Calculation (min) 56 min  -      PT Received On 03/18/23  -      PT - Next Appointment 03/19/23  -            User Key  (r) =  Recorded By, (t) = Taken By, (c) = Cosigned By    Initials Name Provider Type    Gabriela Lamb PTA Physical Therapist Assistant              Therapy Charges for Today     Code Description Service Date Service Provider Modifiers Qty    33164766143 HC PT THER PROC EA 15 MIN 3/18/2023 Gabriela Masters PTA GP 4          PT G-Codes  Outcome Measure Options: AM-PAC 6 Clicks Basic Mobility (PT)  AM-PAC 6 Clicks Score (PT): 18  PT Discharge Summary  Anticipated Discharge Disposition (PT): home with assist, home with home health    Gabriela Masters PTA  3/18/2023

## 2023-03-18 NOTE — PLAN OF CARE
Goal Outcome Evaluation:  Plan of Care Reviewed With: patient, son, friend (son and friend are going to assist at home)        Progress: improving  Outcome Evaluation: Pt agreed to PT session, pt apprehensive and emotional upon entry; had been to [place] earlier, but had LOB , now fearful of falling; encouraged pt and she agreed to try amb; nano amb 100ft x2, seated rest , nano hip protocol x10 reps, pt with lengthy stair training educ , son present, pt nano stairs x3 w/bilat HR and 1 step backward w/wx to practice over threshold, will F/U with any educ to ensure safety at home, pt wants to stay another day per nsg after LOB and scare    Patient was intermittently wearing a face mask during this therapy encounter. Therapist used appropriate personal protective equipment including eye protection, mask, and gloves.  Mask used was standard procedure mask. Appropriate PPE was worn during the entire therapy session. Hand hygiene was completed before and after therapy session. Patient is not in enhanced droplet precautions.

## 2023-03-18 NOTE — DISCHARGE INSTRUCTIONS
Total Hip  Discharge Instructions  Dr. DANIEL Nicolas” Dona II  [phone]    INCISION CARE  Wash your hands prior to dressing changes  MILTON Wound VAC: Postoperatively you had a MILTON Wound Vac placed on the incision. This was placed under sterile conditions in the operating room. It remains in place for 7 days postoperatively. After 7 days, the entire dressing must be removed, including all of the sticky adhesive. The dressing and battery pack provide gentle suction to the incision and provide several benefits over a traditional dressing:  It maintains the sterile environment of the OR and reduces the risk of infection  The suction removes unwanted buildup of blood/hematoma under the skin to reduce swelling  The suction also promotes fresh blood supply to the skin and soft tissue to speed up healing  The postoperative scar is reduced in size  Showering is permitted immediately after surgery, but the battery pack must be protected or removed during the shower.   After 7 days the MILTON Wound Vac is removed. If there is no drainage, no dressing is required. If there is some scant drainage a dry bandage can be applied and changed daily until seen in the office or until the drainage stops.   No creams or ointments to the incisions until 4 weeks post op.  Do not touch or pick at the incision  Check incision every day and notify surgeon immediately if any of the following signs or symptoms are seen:  Increase in redness  Increase in swelling around the incision and of the entire extremity  Increase in pain  NEW drainage or oozing from the incision  Pulling apart of the edges of the incision  Increase in overall body temperature (greater than 100.4 degrees)  Zip-Line: your incision was closed with a state of the art device.   Is a non-invasive and easy to use wound closure device that replaces sutures, staples and glue for surgical incisions  It minimizes scarring and eliminating “railroad” marks that come with staples or  sutures  It makes removal as atraumatic as peeling off a bandage  Can be removed at home or by a physical therapist or nurse at 14 days postoperatively    ACTIVITIES  Exercises:  Physical therapy will begin immediately while in the hospital. Patients going to a nursing home will get therapy as part of their care at the SNU/SNF facility. Patients going home may also have a therapist come to the house to help them mobilize until they can safely get to an outpatient therapy facility.  Elevate the affected leg most of the day during the first week post operatively. Caution must be taken to avoid pillow placement directly under the heel of the leg, as this can cause pressure ulcers even with a soft pillow. All pillows and blankets should be placed underneath of the thigh and calf so that the heel is free-floating.  Use cold packs for 20-30 minutes approximately 5 times per day.  You should perform the daily stretching and strengthening exercises as taught by the therapist as often as possible. This can be done many times a day.  Full weight bearing is allowed after surgery. It will be sore/painful to put weight on the leg, but this will help the bone to heal and prevent complications such as pneumonia, bed sores and blood clots. Mobilization is vital to the recovery process.  Activities of Daily Living:  No tub baths, hot tubs, or swimming pools for 4 weeks.  May shower and let water run over the incision immediately after surgery. The battery pack of the MILTON Wound Vac must be protected or removed while in the shower. After the MILTON is removed 7 days after surgery showering is permitted as long as there is no drainage from the wound.     Restrictions  Weight: It is ok to allow full weight bearing after surgery. Weight on the leg actually quickens the recovery process. While it will be sore/painful to put weight on the leg, it is safe to do so. Hip replacement after hip fracture has a much slower recover process. It can  take months to heal fully from a hip fracture and patients even make some slow benefits up to a year afterwards.   Driving: Many patients have questions about when it is safe to return to driving. The answer is that this is extremely variable. It depends on the extent of the surgery, as well as how quickly you heal. Certainly left leg surgeries make returning to driving easier while right leg surgeries require more extensive rehabilitation before driving can be safe. Until you can press down on the brake hard, and are off of all narcotics, driving is not permitted. Your surgeon cannot “clear” you to return to driving, only you can make the decision when you feel it is safe.    Medications  Anticoagulants: After upper extremity surgery most patients do not require an anticoagulant unless you have another injury that will be keeping you from mobilizing. Lower extremity surgery typically does require use of an anticoagulation medicine.   IF YOU HAD LOWER EXTREMITY SURGERY AND ARE NOT DISCHARGED HOME WITH ANY ANTICOAGULANT MEDICINE YOU SHOULD TAKE ASPIRIN 325mg DAILY FOR 30 DAYS POSTOPERATIVELY.  If you are discharged home with an anticoagulant such as Aspirin, Xarelto, Eliquis, Coumadin, or Lovenox, follow these simple instructions:   Notify surgeon immediately if any romero bleeding is noted in the urine, stool, emesis, or from the nose or the incision. Blood in the stool will often appear as black rather than red. Blood in urine may appear as pink. Blood in emesis may appear as brown/black like coffee grounds.  You will need to apply pressure for longer periods of time to any cuts or abrasions to stop bleeding  Avoid alcohol while taking anticoagulants  Most anticoagulants are to be taken for 30 days postoperatively. After this time, you may stop using them unless instructed otherwise.   If you were already taking an anticoagulant (commonly Aspirin, Coumadin, or Plavix) you will likely be resuming your normal dose  postoperatively and will be continuing that medication at the discretion of the prescribing physician.  Stool Softeners: You will be at greater risk of constipation after surgery due to being less mobile and the pain medications.  Take stool softeners as needed. Over the counter Colace 100 mg 1-2 capsules twice daily can be taken.  If stools become too loose or too frequent, please decreases the dosage or stop the stool softener.  If constipation occurs despite use of stool softeners, you are to continue the stool softeners and add a laxative (Milk of Magnesia 1 ounce daily as needed)  Drink plenty of fluids, and eat fruits and vegetables during your recovery time. Getting up and mobilizing will help the bowels to recover their regular function, as will weaning off of all narcotics when the pain becomes tolerable.  Pain Medications: Utilized after surgery are narcotics. This is some general information about these medications.  CLASSIFICATION: Pain medications are called Opioids and are narcotics  LEGALITIES: It is illegal to share narcotics with others  DRIVING: it is illegal to drive while under the influence of narcotics. Doing so is a DUI.  POTENTIAL SIDE EFFECTS: nausea, vomiting, itching, dizziness, drowsiness, dry mouth, constipation, and difficulty urinating.  POTENTIAL ADVERSE EFFECTS:  Opioid tolerance can develop with use of pain medications and this simply means that it requires more and more of the medication to control pain. However, this is seen more in patients that use opioids for longer periods of time.  Opioid dependence can develop with use of Opioids. People with opioid dependence will experience withdrawal symptoms upon cessation of the medication.  Opioid addiction can develop with use of Opioids. The incidence of this is very unlikely in patients who take the medications as ordered and stop the medications as instructed.  Opioid overdose can be dangerous, but is unlikely when the medication  is taken as ordered and stopped when ordered. It is important not to mix opioids with alcohol as this can lead to over sedation and respiratory difficulty.  DOSAGE:  After the initial surgical pain begins to resolve, you may begin to decrease the pain medication. By the end of a few weeks, you should be off of pain medications.  Refills will not be given by the office during evening hours, on weekends, or after 6 weeks post-op. You are responsible for weaning off of pain medication. You can increase the time between narcotic pills, taking one every 4 then 6 then 8 hours and so on.  To seek refills on pain medications during the initial 6-week post-operative period, you must call the office to request the refill. The office will then notify you when to  the prescription. DO NOT wait until you are out of the medication to request a refill. Prescriptions will not be filled over the weekend and depending on the schedule, it may take a couple days for the prescription to be available. Someone will have to pick the prescription in person at the office.    FOLLOW-UP VISITS  You will need to follow up in the office with your surgeon in 3 weeks, or as instructed elsewhere in your discharge paperwork. Please call this number [phone] to schedule this appointment. If you are going to an SNF/SNU facility, they will arrange for you to follow up in the office.  If you have any concerns or suspected complications prior to your follow up visit, please call the office. Do not wait until your appointment time if you suspect complications. These will need to be addressed in the office promptly.      Israel Carcamo II, MD  Orthopaedic Surgery  [facility]

## 2023-03-18 NOTE — PLAN OF CARE
Goal Outcome Evaluation:  Plan of Care Reviewed With: patient        Progress: improving  Outcome Evaluation: patient ambulating with assistance to bathroom and in hallway, voiding without difficulty, minimal complaints of pain, n/v improving, eating and drinking, plans to d/c home 3/18/23, educated on b/p monitoring

## 2023-03-19 ENCOUNTER — HOME HEALTH ADMISSION (OUTPATIENT)
Dept: HOME HEALTH SERVICES | Facility: HOME HEALTHCARE | Age: 78
End: 2023-03-19
Payer: MEDICARE

## 2023-03-19 ENCOUNTER — TRANSCRIBE ORDERS (OUTPATIENT)
Dept: HOME HEALTH SERVICES | Facility: HOME HEALTHCARE | Age: 78
End: 2023-03-19
Payer: MEDICARE

## 2023-03-19 VITALS
BODY MASS INDEX: 21.66 KG/M2 | HEIGHT: 67 IN | WEIGHT: 138 LBS | SYSTOLIC BLOOD PRESSURE: 112 MMHG | RESPIRATION RATE: 17 BRPM | OXYGEN SATURATION: 95 % | TEMPERATURE: 97.7 F | DIASTOLIC BLOOD PRESSURE: 71 MMHG | HEART RATE: 89 BPM

## 2023-03-19 DIAGNOSIS — Z96.642 S/P TOTAL LEFT HIP ARTHROPLASTY: Primary | ICD-10-CM

## 2023-03-19 LAB
HCT VFR BLD AUTO: 35.3 % (ref 34–46.6)
HGB BLD-MCNC: 11.7 G/DL (ref 12–15.9)

## 2023-03-19 PROCEDURE — A9270 NON-COVERED ITEM OR SERVICE: HCPCS | Performed by: ORTHOPAEDIC SURGERY

## 2023-03-19 PROCEDURE — 63710000001 ASPIRIN 81 MG TABLET DELAYED-RELEASE: Performed by: ORTHOPAEDIC SURGERY

## 2023-03-19 PROCEDURE — 85018 HEMOGLOBIN: CPT | Performed by: ORTHOPAEDIC SURGERY

## 2023-03-19 PROCEDURE — 63710000001 VALSARTAN 160 MG TABLET: Performed by: ORTHOPAEDIC SURGERY

## 2023-03-19 PROCEDURE — G0378 HOSPITAL OBSERVATION PER HR: HCPCS

## 2023-03-19 PROCEDURE — 63710000001 FAMOTIDINE 20 MG TABLET: Performed by: ORTHOPAEDIC SURGERY

## 2023-03-19 PROCEDURE — 97110 THERAPEUTIC EXERCISES: CPT

## 2023-03-19 PROCEDURE — 63710000001 MELOXICAM 15 MG TABLET: Performed by: ORTHOPAEDIC SURGERY

## 2023-03-19 PROCEDURE — 85014 HEMATOCRIT: CPT | Performed by: ORTHOPAEDIC SURGERY

## 2023-03-19 RX ADMIN — FAMOTIDINE 40 MG: 20 TABLET, FILM COATED ORAL at 10:11

## 2023-03-19 RX ADMIN — MELOXICAM 15 MG: 15 TABLET ORAL at 10:11

## 2023-03-19 RX ADMIN — VALSARTAN 160 MG: 160 TABLET, FILM COATED ORAL at 06:09

## 2023-03-19 RX ADMIN — ASPIRIN 81 MG: 81 TABLET, COATED ORAL at 10:10

## 2023-03-19 NOTE — PROGRESS NOTES
Patient is discharging today. Patient agreeable to home health and has no current home health. Face sheet information is correct and will transcribe home health orders per ortho care plan/ Dr Pawan Carcamo. Thank you !

## 2023-03-19 NOTE — PROGRESS NOTES
"/71   Pulse 89   Temp 97.7 °F (36.5 °C) (Oral)   Resp 17   Ht 170.2 cm (67\")   Wt 62.6 kg (138 lb)   LMP  (LMP Unknown)   SpO2 95%   BMI 21.61 kg/m²     Results from last 7 days   Lab Units 03/19/23  0501   HEMOGLOBIN g/dL 11.7*   HEMATOCRIT % 35.3       Results from last 7 days   Lab Units 03/17/23  1445   SODIUM mmol/L 134*   POTASSIUM mmol/L 4.6   CHLORIDE mmol/L 100   CO2 mmol/L 25.1   BUN mg/dL 10   CREATININE mg/dL 0.77   GLUCOSE mg/dL 119*   CALCIUM mg/dL 8.7       Imaging Results (Last 24 Hours)     ** No results found for the last 24 hours. **          Patient Care Team:  Olivia Peres MD as PCP - General (Family Medicine)  Kammerling, James M, MD as Consulting Physician (Cardiac Electrophysiology)    SUBJECTIVE  Doing better  PHYSICAL EXAM  Wound looks great     Hip joint replacement status    Moderate malnutrition (HCC)      PLAN / DISPOSITION:  D/C with NIKITA Perkins  03/19/23  10:18 EDT    "

## 2023-03-19 NOTE — PLAN OF CARE
Goal Outcome Evaluation:  Plan of Care Reviewed With: patient        Progress: improving  Outcome Evaluation: Pt incr amb dist to 250ft, SBA, all bed mob and tfers Superv/SBA and verbal cues only; issued rwx for home, plans HH and [facility] assist , ready for DC

## 2023-03-19 NOTE — THERAPY TREATMENT NOTE
Patient Name: Debo Blair  : 1945    MRN: [id number]                              Today's Date: 3/19/2023       Admit Date: 3/17/2023    Visit Dx:     ICD-10-CM ICD-9-CM   1. Status post left hip replacement  Z96.642 V43.64     Patient Active Problem List   Diagnosis   • Adenomatous colon polyp   • History of adenomatous polyp of colon   • Adenomatous polyp of colon   • Hip joint replacement status   • Moderate malnutrition (HCC)     Past Medical History:   Diagnosis Date   • Anxiety    • Chest pain syndrome     FOLLOWED BY DR [facility]   • Coronary artery disease 2022   • Diverticulosis    • Fibrocystic breast changes    • GERD (gastroesophageal reflux disease)    • History of colon polyps    • Hyperlipidemia    • Hypertension    • IBS (irritable bowel syndrome)    • Lactose intolerance    • Lichen sclerosus    • Melanosis coli    • Nocturia    • Osteoarthritis    • Osteoporosis    • Palpitations    • Paralytic ileus (HCC)    • PONV (postoperative nausea and vomiting)      Past Surgical History:   Procedure Laterality Date   • ANAL FISSURECTOMY     • CHOLECYSTECTOMY     • COLON RESECTION N/A 2016    Procedure: COLON RESECTION LAPAROSCOPIC LEFT MINDY COLECTOMY ;  Surgeon: Nikki Smith MD;  Location: [facility] OR;  Service:    • COLONOSCOPY  2016    polyps, NBIH, tubular adenoma w/low grade dysplasia   • COLONOSCOPY N/A 2018    Procedure: COLONOSCOPY TO CECUM AND INTO TERMINAL ILEUM WITH HOT SNARE POLYPECTOMY,  5 ML SALINE LIFT INJECTION, 1 ML TATTOO INK INJECTION, RESOLUTION CLIP X1 PLACEMENT, AND APC CAUTERY TO TRANSVERSE COLON POLYP SITE;  Surgeon: Luna Becerra MD;  Location: [facility] ENDOSCOPY;  Service: Gastroenterology   • COLONOSCOPY N/A 2021    Procedure: COLONOSCOPY to anastamosis and cecum with cold/hot snare polypectomies;  Surgeon: Luna Becerra MD;  Location: [facility] ENDOSCOPY;  Service: Gastroenterology;  Laterality:  N/A;  Pre: h/x of colon polyps  Post: diverticulosis, polyps, left sided anastamosis, hemorrhoids   • HYSTERECTOMY     • SKIN TAG REMOVAL  2004      General Information     Row Name 03/19/23 1752          Physical Therapy Time and Intention    Document Type therapy note (daily note);discharge treatment  -     Mode of Treatment individual therapy;physical therapy  -     Row Name 03/19/23 1752          General Information    Patient Profile Reviewed yes  -     Existing Precautions/Restrictions fall;hip, anterior;left  -     Row Name 03/19/23 1752          Living Environment    People in Home friend(s);child(jenn), adult  initially  -     Row Name 03/19/23 1752          Cognition    Orientation Status (Cognition) oriented x 4  -     Row Name 03/19/23 1752          Safety Issues, Functional Mobility    Safety Issues Affecting Function (Mobility) impulsivity  -     Impairments Affecting Function (Mobility) strength  -           User Key  (r) = Recorded By, (t) = Taken By, (c) = Cosigned By    Initials Name Provider Type     Gabriela Masters PTA Physical Therapist Assistant               Mobility     Row Name 03/19/23 1753          Bed Mobility    Bed Mobility sit-supine  -     Supine-Sit [place] (Bed Mobility) supervision  -     Sit-Supine [place] (Bed Mobility) supervision  -     Row Name 03/19/23 1753          Sit-Stand Transfer    Sit-Stand [place] (Transfers) standby assist  -     Assistive Device (Sit-Stand Transfers) walker, front-wheeled  -     Row Name 03/19/23 1753          Gait/Stairs (Locomotion)    [place] Level (Gait) standby assist;verbal cues  -     Assistive Device (Gait) walker, front-wheeled  -     Distance in Feet (Gait) 250ft  -     Deviations/Abnormal Patterns (Gait) base of support, narrow;deirdre decreased;stride length decreased  -     Bilateral Gait Deviations forward flexed posture  improved w/o cues today  -           User Key  (r) =  Recorded By, (t) = Taken By, (c) = Cosigned By    Initials Name Provider Type    Gabriela Lamb PTA Physical Therapist Assistant               Obj/Interventions     Row Name 03/19/23 1754          Motor Skills    Therapeutic Exercise --  THR protocol , at length and handwritten educ on exer in case she can't find booklet  -           User Key  (r) = Recorded By, (t) = Taken By, (c) = Cosigned By    Initials Name Provider Type    Gabriela Lamb PTA Physical Therapist Assistant               Goals/Plan    No documentation.                Clinical Impression     [facility] Name 03/19/23 1755          Pain    Pretreatment Pain Rating 0/10 - no pain  -     Posttreatment Pain Rating 3/10  -     Pain Location - Side/Orientation Left  -     Pain Location - hip  -     Pre/Posttreatment Pain Comment and thigh  -     Pain Intervention(s) Medication (See MAR);Repositioned;Rest  tylenol once at home if needed  -[facility] Name 03/19/23 1755          Plan of Care Review    Plan of Care Reviewed With patient  -     Progress improving  -     Outcome Evaluation Pt incr amb dist to 250ft, SBA, all bed mob and tfers Superv/SBA and verbal cues only; issued rwx for home, plans HH and fam assist , ready for DC  -[facility] Name 03/19/23 1755          Therapy Assessment/Plan (PT)    Rehab Potential (PT) good, to achieve stated therapy goals  -     Criteria for Skilled Interventions Met (PT) yes  -[facility] Name 03/19/23 1755          Vital Signs    O2 Delivery Pre Treatment room air  -[facility] 03/19/23 1755          Positioning and Restraints    Pre-Treatment Position in bed  -     Post Treatment Position chair  -     In Chair sitting;call light within reach;encouraged to call for assist;exit alarm on;with family/caregiver  -           User Key  (r) = Recorded By, (t) = Taken By, (c) = Cosigned By    Initials Name Provider Type    Gabriela Lamb PTA Physical Therapist Assistant                Outcome Measures     Row Name 03/19/23 1758 03/19/23 0805       How much help from another person do you currently need...    Turning from your back to your side while in flat bed without using bedrails? 4  - 4  -DEREJE    Moving from lying on back to sitting on the side of a flat bed without bedrails? 4  - 4  -DEREJE    Moving to and from a bed to a chair (including a wheelchair)? 4  - 4  -DEREJE    Standing up from a chair using your arms (e.g., wheelchair, bedside chair)? 4  - 4  -DEREJE    Climbing 3-5 steps with a railing? 3  - 3  -DEREJE    To walk in hospital room? 4  - 4  -DEREJE    AM-PAC 6 Clicks Score (PT) 23  - 23  -DEREJE    Highest level of mobility 7 --> Walked 25 feet or more  - 7 --> Walked 25 feet or more  -          User Key  (r) = Recorded By, (t) = Taken By, (c) = Cosigned By    Initials Name Provider Type    Gabriela Lamb PTA Physical Therapist Assistant    M[place] Bower, RN Registered Nurse                             Physical Therapy Education     Title: PT OT SLP Therapies (Resolved)     Topic: Physical Therapy (Resolved)     Point: Mobility training (Resolved)     Learning Progress Summary           Patient Acceptance, E,TB,D, VU,NR by NURY at 3/18/2023 1632    Acceptance, E, VU by ALFRED at 3/17/2023 1602   Family Acceptance, E,TB,D, VU,NR by NURY at 3/18/2023 1632                   Point: Home exercise program (Resolved)     Learning Progress Summary           Patient Acceptance, E,TB,D, VU,NR by NURY at 3/18/2023 1632    Acceptance, E, VU by ALFRED at 3/17/2023 1602   Family Acceptance, E,TB,D, VU,NR by NURY at 3/18/2023 1632                   Point: Body mechanics (Resolved)     Learning Progress Summary           Patient Acceptance, E,TB,D, VU,NR by NURY at 3/18/2023 1632    Acceptance, E, VU by  at 3/17/2023 1602   Family Acceptance, E,TB,D, VU,NR by NURY at 3/18/2023 1632                   Point: Precautions (Resolved)     Learning Progress Summary           Patient Acceptance, E,TB,D, VU,NR by NURY at  3/18/2023 1632    Acceptance, E, VU by  at 3/17/2023 1602   Family Acceptance, E,TB,D, VU,NR by NURY at 3/18/2023 1632                               User Key     Initials Effective Dates Name Provider Type Discipline     03/07/18 -  Gabriela Masters PTA Physical Therapist Assistant PT     05/02/22 -  Vivian Zurita, TAMIKA Physical Therapist PT              PT Recommendation and Plan     Plan of Care Reviewed With: patient  Progress: improving  Outcome Evaluation: Pt incr amb dist to 250ft, SBA, all bed mob and tfers Superv/SBA and verbal cues only; issued rwx for home, plans HH and fam assist , ready for DC     Time Calculation:    PT Charges     Row Name 03/19/23 1753             Time Calculation    Start Time 1050  -      Stop Time 1137  -      Time Calculation (min) 47 min  -      PT Received On 03/19/23  -            User Key  (r) = Recorded By, (t) = Taken By, (c) = Cosigned By    Initials Name Provider Type     Gabriela Masters PTA Physical Therapist Assistant              Therapy Charges for Today     Code Description Service Date Service Provider Modifiers Qty    99813834617 HC PT THER PROC EA 15 MIN 3/18/2023 Gabriela Masters PTA GP 4    67434650680 HC PT THER PROC EA 15 MIN 3/19/2023 Gabriela Masters PTA GP 3          PT G-Codes  Outcome Measure Options: AM-PAC 6 Clicks Basic Mobility (PT)  AM-PAC 6 Clicks Score (PT): 23  PT Discharge Summary  Anticipated Discharge Disposition (PT): home with assist, home with home health    Gabriela Masters PTA  3/19/2023

## 2023-03-19 NOTE — PLAN OF CARE
Goal Outcome Evaluation:  Plan of Care Reviewed With: patient        Progress: improving  Outcome Evaluation: patient ambulating with standby assistance to bathroom, voiding without difficulty, minimal complaints of pain, plans to d/c home in 3/19/23, educated on b/p monitoring

## 2023-03-20 ENCOUNTER — HOME CARE VISIT (OUTPATIENT)
Dept: HOME HEALTH SERVICES | Facility: HOME HEALTHCARE | Age: 78
End: 2023-03-20
Payer: MEDICARE

## 2023-03-20 VITALS
DIASTOLIC BLOOD PRESSURE: 78 MMHG | HEART RATE: 68 BPM | OXYGEN SATURATION: 98 % | RESPIRATION RATE: 16 BRPM | TEMPERATURE: 97.7 F | SYSTOLIC BLOOD PRESSURE: 122 MMHG

## 2023-03-20 PROCEDURE — G0151 HHCP-SERV OF PT,EA 15 MIN: HCPCS

## 2023-03-20 NOTE — HOME HEALTH
"Physical Therapy Evaluation   Diagnosis: left JESSY  Focus of Care: left JESSY  Surgical Procedure and date: left JESSY 3/17/23  Pertinent Medical History: see epic    Prior level of function:   Ambulation: independent   Activities of daily living: independent    Instrumental activities of daily living: independent    Driving: drives   Other/ Hobbies/Work:      Home Environment: lives alone in home with steps to enter/ exit, friend staying with her at present  Goal for Physical Therapy: \"to be able to function normally\"  Skilled Physical Therapy indicated for instruction in gait, exercise, education and home safety.    Plan for next visit: review exercises and gait"

## 2023-03-20 NOTE — DISCHARGE SUMMARY
Orthopaedic Discharge Summary  [facility]  [phone]    NAME: Debo SAMAYOA [place] PCP: Olivia Peres MD   :  MRN: [id number]  [id number] LOS:  ADMIT: 0 days  3/17/2023   AGE/SEX: 77 y.o. female DC:  today             · Admitting Diagnosis: Hip joint replacement status [Z96.649]    · Surgery Performed: [place] ARTHRP ACETBLR/PROX FEM PROSTC AGRFT/ALGRFT [10316] (LEFT TOTAL HIP ARTHROPLASTY ANTERIOR)    · Discharge Medications:         Discharge Medications      New Medications      Instructions Start Date   Aspirin Low Dose 81 MG EC tablet  Generic drug: aspirin   81 mg, Oral, Every 12 Hours      oxyCODONE-acetaminophen 5-325 MG per tablet  Commonly known as: PERCOCET   1 tablet, Oral, Every 4 Hours PRN         Continue These Medications      Instructions Start Date   acetaminophen 500 MG tablet  Commonly known as: TYLENOL   500 mg, Oral, Every 6 Hours PRN      ALPRAZolam 1 MG tablet  Commonly known as: XANAX   0.5 mg, Oral, Nightly      Avenova 0.01 % solution   Both Eyes, Nightly      Chlorhexidine Gluconate Cloth 2 % pads   Apply externally, PRIOR TO OR      coenzyme Q10 100 MG capsule   200 mg, Oral, Every Evening      metoprolol succinate XL 25 MG 24 hr tablet  Commonly known as: TOPROL-XL   25 mg, Oral, Every Evening      montelukast 10 MG tablet  Commonly known as: SINGULAIR   10 mg, Oral, Every Evening      polyethylene glycol 17 g packet  Commonly known as: MIRALAX   17 g, Oral, Nightly      pravastatin 20 MG tablet  Commonly known as: PRAVACHOL   20 mg, Oral, Nightly      PROBIOTIC DAILY PO   1 tablet, Oral, As Needed      simethicone 125 MG chewable tablet  Commonly known as: MYLICON   125 mg, Oral, Every 6 Hours PRN      SUMAtriptan 100 MG tablet  Commonly known as: IMITREX   100 mg, Oral, As Needed      triamcinolone 0.1 % ointment  Commonly known as: KENALOG   Topical, 2 Times Daily      valACYclovir 1000 MG tablet  Commonly known as: VALTREX   1,000 mg, Oral, As Needed      valsartan 80  MG tablet  Commonly known as: DIOVAN   160 mg, Oral, Every Morning      vitamin D 1.25 MG (06352 UT) capsule capsule  Commonly known as: ERGOCALCIFEROL   50,000 Units, Every 7 Days, SATURDAYS             · Vitals:     Vitals:    03/18/23 1821 03/19/23 0232 03/19/23 0608 03/19/23 1013   BP: 106/62 124/76 112/60 112/71   BP Location: Right arm Right arm Right arm    Patient Position: Lying Lying Lying    Pulse: 64 64 76 89   Resp: 16 16 16 17   Temp: 97.4 °F (36.3 °C) 98.3 °F (36.8 °C) 99.1 °F (37.3 °C) 97.7 °F (36.5 °C)   TempSrc: Oral Oral Oral Oral   SpO2: 97% 96% 95% 95%   Weight:       Height:           · Labs:      Admission on 03/17/2023, Discharged on 03/19/2023   Component Date Value Ref Range Status   • Glucose 03/17/2023 119 (H)  65 - 99 mg/dL Final   • BUN 03/17/2023 10  8 - 23 mg/dL Final   • Creatinine 03/17/2023 0.77  0.57 - 1.00 mg/dL Final   • Sodium 03/17/2023 134 (L)  136 - 145 mmol/L Final   • Potassium 03/17/2023 4.6  3.5 - 5.2 mmol/L Final   • Chloride 03/17/2023 100  98 - 107 mmol/L Final   • CO2 03/17/2023 25.1  22.0 - 29.0 mmol/L Final   • Calcium 03/17/2023 8.7  8.6 - 10.5 mg/dL Final   • BUN/Creatinine Ratio 03/17/2023 13.0  7.0 - 25.0 Final   • Anion Gap 03/17/2023 8.9  5.0 - 15.0 mmol/L Final   • eGFR 03/17/2023 80.6  >60.0 mL/min/1.73 Final   • Hemoglobin 03/17/2023 12.9  12.0 - 15.9 g/dL Final   • Hematocrit 03/17/2023 37.9  34.0 - 46.6 % Final   • Hemoglobin 03/18/2023 10.9 (L)  12.0 - 15.9 g/dL Final   • Hematocrit 03/18/2023 31.2 (L)  34.0 - 46.6 % Final   • Hemoglobin 03/19/2023 11.7 (L)  12.0 - 15.9 g/dL Final   • Hematocrit 03/19/2023 35.3  34.0 - 46.6 % Final        No results found.    · Hospital Course:   77 y.o. female was admitted to [facility] [place] to services of Israel Carcamo II, MD with Hip joint replacement status [Z96.649] on 3/17/2023 and underwent ME ARTHRP ACETBLR/PROX FEM PROSTC AGRFT/ALGRFT [15557] (LEFT TOTAL HIP ARTHROPLASTY ANTERIOR). Post-operatively  "the patient transferred to the floor where the patient underwent mobilization therapy. Opioids were titrated to achieve appropriate pain management to allow for participation in mobilization exercises. Vital signs and laboratory values are now within safe parameters for discharge. The dressings and/or incision is intact without signs or symptoms of active infection. Operative extremity neurovascular status remains intact as compared to the preoperative exam. Appropriate education re: incision care, activity levels, medications, and follow up visits was completed and all questions were answered. The patient is now deemed stable for discharge.    HOME: The patient progressed well with physical therapy. There were cleared for discharge to home. The yaminietn was sent home in good condition}.       R \"Pawan\" Dona STEIN MD  Orthopaedic Surgery  [facility]  [phone]                                               "

## 2023-03-20 NOTE — CASE COMMUNICATION
Patient is left JESSY Dr Carcamo 3/17/23.  2w3 for PT.  Lives alone but has friend staying with her for now.  MILTON dressing has one drop dried drainage otherwise intact and functioning properly.  Was able to perform all exercises and walk with walker.

## 2023-03-22 ENCOUNTER — HOME CARE VISIT (OUTPATIENT)
Dept: HOME HEALTH SERVICES | Facility: HOME HEALTHCARE | Age: 78
End: 2023-03-22
Payer: MEDICARE

## 2023-03-22 PROCEDURE — G0151 HHCP-SERV OF PT,EA 15 MIN: HCPCS

## 2023-03-26 VITALS
RESPIRATION RATE: 18 BRPM | OXYGEN SATURATION: 96 % | SYSTOLIC BLOOD PRESSURE: 128 MMHG | HEART RATE: 78 BPM | TEMPERATURE: 98.2 F | DIASTOLIC BLOOD PRESSURE: 76 MMHG

## 2023-03-27 ENCOUNTER — HOME CARE VISIT (OUTPATIENT)
Dept: HOME HEALTH SERVICES | Facility: HOME HEALTHCARE | Age: 78
End: 2023-03-27
Payer: MEDICARE

## 2023-03-27 PROCEDURE — G0151 HHCP-SERV OF PT,EA 15 MIN: HCPCS

## 2023-03-28 VITALS
DIASTOLIC BLOOD PRESSURE: 55 MMHG | HEART RATE: 92 BPM | RESPIRATION RATE: 18 BRPM | OXYGEN SATURATION: 99 % | TEMPERATURE: 97.8 F | SYSTOLIC BLOOD PRESSURE: 112 MMHG

## 2023-03-31 ENCOUNTER — HOME CARE VISIT (OUTPATIENT)
Dept: HOME HEALTH SERVICES | Facility: HOME HEALTHCARE | Age: 78
End: 2023-03-31
Payer: MEDICARE

## 2023-03-31 VITALS
OXYGEN SATURATION: 98 % | DIASTOLIC BLOOD PRESSURE: 64 MMHG | TEMPERATURE: 97.1 F | SYSTOLIC BLOOD PRESSURE: 100 MMHG | RESPIRATION RATE: 18 BRPM | HEART RATE: 83 BPM

## 2023-03-31 PROCEDURE — G0151 HHCP-SERV OF PT,EA 15 MIN: HCPCS

## 2023-03-31 NOTE — HOME HEALTH
Plan for next visit: walk outside, review exercises    Subjective:  I sent my girlfriend home.  She was driving me crazy.    Falls since last visit:  none   Home/Social Environment:  lives alone in home with steps

## 2023-04-03 ENCOUNTER — HOME CARE VISIT (OUTPATIENT)
Dept: HOME HEALTH SERVICES | Facility: HOME HEALTHCARE | Age: 78
End: 2023-04-03
Payer: MEDICARE

## 2023-04-03 VITALS
OXYGEN SATURATION: 98 % | DIASTOLIC BLOOD PRESSURE: 80 MMHG | SYSTOLIC BLOOD PRESSURE: 128 MMHG | TEMPERATURE: 97.5 F | RESPIRATION RATE: 16 BRPM | HEART RATE: 79 BPM

## 2023-04-03 PROCEDURE — G0151 HHCP-SERV OF PT,EA 15 MIN: HCPCS

## 2023-04-03 NOTE — HOME HEALTH
Plan for next visit: OASIS discharge    Subjective:  I'm sore.  Could it be the weather?  My back is buzzing.  Can I use my back wrap?  Falls since last visit:  none   Home/Social Environment:  lives alone in home with steps

## 2023-04-07 ENCOUNTER — HOME CARE VISIT (OUTPATIENT)
Dept: HOME HEALTH SERVICES | Facility: HOME HEALTHCARE | Age: 78
End: 2023-04-07
Payer: MEDICARE

## 2023-04-07 VITALS
OXYGEN SATURATION: 98 % | HEART RATE: 81 BPM | SYSTOLIC BLOOD PRESSURE: 122 MMHG | TEMPERATURE: 97.3 F | RESPIRATION RATE: 16 BRPM | DIASTOLIC BLOOD PRESSURE: 78 MMHG

## 2023-04-07 PROCEDURE — G0151 HHCP-SERV OF PT,EA 15 MIN: HCPCS

## 2023-04-07 NOTE — HOME HEALTH
Subjective:  I think the aspirin is making me sick to my stomach.    Falls since last visit:  none   Home/Social Environment:  lives alone in home with steps

## 2024-05-08 ENCOUNTER — OFFICE VISIT (OUTPATIENT)
Dept: GASTROENTEROLOGY | Facility: CLINIC | Age: 79
End: 2024-05-08
Payer: MEDICARE

## 2024-05-08 VITALS
WEIGHT: 158.4 LBS | BODY MASS INDEX: 25.46 KG/M2 | DIASTOLIC BLOOD PRESSURE: 77 MMHG | HEIGHT: 66 IN | SYSTOLIC BLOOD PRESSURE: 118 MMHG | HEART RATE: 69 BPM | TEMPERATURE: 96.6 F

## 2024-05-08 DIAGNOSIS — Z87.19 HISTORY OF ANAL FISSURES: ICD-10-CM

## 2024-05-08 DIAGNOSIS — K59.00 CONSTIPATION, UNSPECIFIED CONSTIPATION TYPE: Primary | ICD-10-CM

## 2024-08-25 ENCOUNTER — HOSPITAL ENCOUNTER (EMERGENCY)
Facility: HOSPITAL | Age: 79
Discharge: HOME | End: 2024-08-25
Attending: EMERGENCY MEDICINE | Admitting: EMERGENCY MEDICINE
Payer: MEDICARE

## 2024-08-25 ENCOUNTER — APPOINTMENT (OUTPATIENT)
Dept: GENERAL RADIOLOGY | Facility: HOSPITAL | Age: 79
End: 2024-08-25
Payer: MEDICARE

## 2024-08-25 VITALS
SYSTOLIC BLOOD PRESSURE: 127 MMHG | DIASTOLIC BLOOD PRESSURE: 72 MMHG | WEIGHT: 166.9 LBS | RESPIRATION RATE: 16 BRPM | TEMPERATURE: 97.5 F | HEART RATE: 88 BPM | HEIGHT: 66 IN | BODY MASS INDEX: 26.82 KG/M2 | OXYGEN SATURATION: 95 %

## 2024-08-25 DIAGNOSIS — E87.1 HYPONATREMIA: ICD-10-CM

## 2024-08-25 DIAGNOSIS — R79.89 ELEVATED LFTS: ICD-10-CM

## 2024-08-25 DIAGNOSIS — J18.9 PNEUMONIA OF RIGHT LOWER LOBE DUE TO INFECTIOUS ORGANISM: Primary | ICD-10-CM

## 2024-08-25 LAB
ALBUMIN SERPL-MCNC: 3.9 G/DL (ref 3.5–5.2)
ALBUMIN/GLOB SERPL: 1.4 G/DL
ALP SERPL-CCNC: 209 U/L (ref 39–117)
ALT SERPL W P-5'-P-CCNC: 105 U/L (ref 1–33)
ANION GAP SERPL CALCULATED.3IONS-SCNC: 13.8 MMOL/L (ref 5–15)
AST SERPL-CCNC: 190 U/L (ref 1–32)
B PARAPERT DNA SPEC QL NAA+PROBE: NOT DETECTED
B PERT DNA SPEC QL NAA+PROBE: NOT DETECTED
BASOPHILS # BLD AUTO: 0.04 10*3/MM3 (ref 0–0.2)
BASOPHILS NFR BLD AUTO: 0.3 % (ref 0–1.5)
BILIRUB SERPL-MCNC: 0.4 MG/DL (ref 0–1.2)
BUN SERPL-MCNC: 21 MG/DL (ref 8–23)
BUN/CREAT SERPL: 21.2 (ref 7–25)
C PNEUM DNA NPH QL NAA+NON-PROBE: NOT DETECTED
CALCIUM SPEC-SCNC: 9.1 MG/DL (ref 8.6–10.5)
CHLORIDE SERPL-SCNC: 99 MMOL/L (ref 98–107)
CO2 SERPL-SCNC: 18.2 MMOL/L (ref 22–29)
CREAT SERPL-MCNC: 0.99 MG/DL (ref 0.57–1)
DEPRECATED RDW RBC AUTO: 40.9 FL (ref 37–54)
EGFRCR SERPLBLD CKD-EPI 2021: 59.2 ML/MIN/1.73
EOSINOPHIL # BLD AUTO: 0 10*3/MM3 (ref 0–0.4)
EOSINOPHIL NFR BLD AUTO: 0 % (ref 0.3–6.2)
ERYTHROCYTE [DISTWIDTH] IN BLOOD BY AUTOMATED COUNT: 12 % (ref 12.3–15.4)
FLUAV SUBTYP SPEC NAA+PROBE: NOT DETECTED
FLUBV RNA ISLT QL NAA+PROBE: NOT DETECTED
GLOBULIN UR ELPH-MCNC: 2.7 GM/DL
GLUCOSE SERPL-MCNC: 98 MG/DL (ref 65–99)
HADV DNA SPEC NAA+PROBE: NOT DETECTED
HCOV 229E RNA SPEC QL NAA+PROBE: NOT DETECTED
HCOV HKU1 RNA SPEC QL NAA+PROBE: NOT DETECTED
HCOV NL63 RNA SPEC QL NAA+PROBE: NOT DETECTED
HCOV OC43 RNA SPEC QL NAA+PROBE: NOT DETECTED
HCT VFR BLD AUTO: 43.8 % (ref 34–46.6)
HGB BLD-MCNC: 14.5 G/DL (ref 12–15.9)
HMPV RNA NPH QL NAA+NON-PROBE: NOT DETECTED
HPIV1 RNA ISLT QL NAA+PROBE: NOT DETECTED
HPIV2 RNA SPEC QL NAA+PROBE: NOT DETECTED
HPIV3 RNA NPH QL NAA+PROBE: NOT DETECTED
HPIV4 P GENE NPH QL NAA+PROBE: NOT DETECTED
IMM GRANULOCYTES # BLD AUTO: 0.09 10*3/MM3 (ref 0–0.05)
IMM GRANULOCYTES NFR BLD AUTO: 0.6 % (ref 0–0.5)
LYMPHOCYTES # BLD AUTO: 1.66 10*3/MM3 (ref 0.7–3.1)
LYMPHOCYTES NFR BLD AUTO: 11.5 % (ref 19.6–45.3)
M PNEUMO IGG SER IA-ACNC: NOT DETECTED
MCH RBC QN AUTO: 30.5 PG (ref 26.6–33)
MCHC RBC AUTO-ENTMCNC: 33.1 G/DL (ref 31.5–35.7)
MCV RBC AUTO: 92.2 FL (ref 79–97)
MONOCYTES # BLD AUTO: 2.11 10*3/MM3 (ref 0.1–0.9)
MONOCYTES NFR BLD AUTO: 14.6 % (ref 5–12)
NEUTROPHILS NFR BLD AUTO: 10.52 10*3/MM3 (ref 1.7–7)
NEUTROPHILS NFR BLD AUTO: 73 % (ref 42.7–76)
NRBC BLD AUTO-RTO: 0 /100 WBC (ref 0–0.2)
NT-PROBNP SERPL-MCNC: 1025 PG/ML (ref 0–1800)
PLATELET # BLD AUTO: 122 10*3/MM3 (ref 140–450)
PMV BLD AUTO: 12.1 FL (ref 6–12)
POTASSIUM SERPL-SCNC: 4.1 MMOL/L (ref 3.5–5.2)
PROT SERPL-MCNC: 6.6 G/DL (ref 6–8.5)
RBC # BLD AUTO: 4.75 10*6/MM3 (ref 3.77–5.28)
RHINOVIRUS RNA SPEC NAA+PROBE: NOT DETECTED
RSV RNA NPH QL NAA+NON-PROBE: NOT DETECTED
SARS-COV-2 RNA NPH QL NAA+NON-PROBE: NOT DETECTED
SODIUM SERPL-SCNC: 131 MMOL/L (ref 136–145)
TROPONIN T SERPL HS-MCNC: 22 NG/L
WBC NRBC COR # BLD AUTO: 14.42 10*3/MM3 (ref 3.4–10.8)

## 2024-08-25 PROCEDURE — 93005 ELECTROCARDIOGRAM TRACING: CPT | Performed by: EMERGENCY MEDICINE

## 2024-08-25 PROCEDURE — 83880 ASSAY OF NATRIURETIC PEPTIDE: CPT | Performed by: EMERGENCY MEDICINE

## 2024-08-25 PROCEDURE — 71045 X-RAY EXAM CHEST 1 VIEW: CPT

## 2024-08-25 PROCEDURE — 0202U NFCT DS 22 TRGT SARS-COV-2: CPT | Performed by: EMERGENCY MEDICINE

## 2024-08-25 PROCEDURE — 84484 ASSAY OF TROPONIN QUANT: CPT | Performed by: EMERGENCY MEDICINE

## 2024-08-25 PROCEDURE — 99284 EMERGENCY DEPT VISIT MOD MDM: CPT

## 2024-08-25 PROCEDURE — 85025 COMPLETE CBC W/AUTO DIFF WBC: CPT | Performed by: EMERGENCY MEDICINE

## 2024-08-25 PROCEDURE — 93010 ELECTROCARDIOGRAM REPORT: CPT | Performed by: INTERNAL MEDICINE

## 2024-08-25 PROCEDURE — 80053 COMPREHEN METABOLIC PANEL: CPT | Performed by: EMERGENCY MEDICINE

## 2024-08-25 RX ORDER — GUAIFENESIN AND DEXTROMETHORPHAN HYDROBROMIDE 600; 30 MG/1; MG/1
1 TABLET, EXTENDED RELEASE ORAL 2 TIMES DAILY PRN
Qty: 20 TABLET | Refills: 0 | Status: SHIPPED | OUTPATIENT
Start: 2024-08-25

## 2024-08-25 NOTE — DISCHARGE INSTRUCTIONS
Take cough medication as prescribed.  Continue taking amoxicillin and using your albuterol inhaler as previously prescribed.  You will need to have your liver enzymes rechecked in the next few weeks.  Follow-up with your primary care provider.  Return to the emergency department for worsening/persistent symptoms, chest pain, fever, vomiting, or other concern.

## 2024-08-25 NOTE — ED PROVIDER NOTES
EMERGENCY DEPARTMENT ENCOUNTER    Room Number:  11/11  PCP: Olivia Peres MD  Historian: Patient    I initially evaluated the patient at 9:20 AM    HPI:  Chief Complaint: Cough, shortness of breath  A complete HPI/ROS/PMH/PSH/SH/FH are unobtainable due to: Nothing  Context: Debo Blair is a 78 y.o. female with a medical history of hypertension, anxiety, GERD, asthma, CAD who presents to the ED c/o acute cough and shortness of breath for the past 1 week.  Cough is nonproductive.  Shortness of breath is worse with exertion.  She denies fever, chills, sore throat, runny nose, wheezing, chest pain, palpitations, leg pain, or leg swelling.  She saw her PCP 4 days ago and was prescribed prednisone, albuterol, and amoxicillin.  COVID and flu test were negative at that time.  She denies sick contacts.            PAST MEDICAL HISTORY  Active Ambulatory Problems     Diagnosis Date Noted    Adenomatous colon polyp 03/21/2016    History of adenomatous polyp of colon 07/02/2018    Adenomatous polyp of colon 03/31/2021    Hip joint replacement status 03/17/2023    Moderate malnutrition 03/17/2023     Resolved Ambulatory Problems     Diagnosis Date Noted    No Resolved Ambulatory Problems     Past Medical History:   Diagnosis Date    Anxiety     Chest pain syndrome     Colon polyp     Coronary artery disease 08/09/2022    Diverticulosis     Fibrocystic breast changes 1971    GERD (gastroesophageal reflux disease)     History of colon polyps     Hyperlipidemia     Hypertension     IBS (irritable bowel syndrome)     Lactose intolerance     Lichen sclerosus     Melanosis coli     Nocturia     Osteoarthritis     Osteoporosis     Palpitations     Paralytic ileus 1975    PONV (postoperative nausea and vomiting)          PAST SURGICAL HISTORY  Past Surgical History:   Procedure Laterality Date    ABDOMINAL SURGERY  2017    ANAL FISSURECTOMY  2004    CHOLECYSTECTOMY      COLON RESECTION N/A 03/21/2016    Procedure: COLON  RESECTION LAPAROSCOPIC LEFT MINDY COLECTOMY ;  Surgeon: Nikki Smith MD;  Location: [facility] MAIN OR;  Service:     COLONOSCOPY  2016    polyps, NBIH, tubular adenoma w/low grade dysplasia    COLONOSCOPY N/A 2018    Procedure: COLONOSCOPY TO CECUM AND INTO TERMINAL ILEUM WITH HOT SNARE POLYPECTOMY,  5 ML SALINE LIFT INJECTION, 1 ML TATTOO INK INJECTION, RESOLUTION CLIP X1 PLACEMENT, AND APC CAUTERY TO TRANSVERSE COLON POLYP SITE;  Surgeon: Luna Becerra MD;  Location: [facility] ENDOSCOPY;  Service: Gastroenterology    COLONOSCOPY N/A 2021    Procedure: COLONOSCOPY to anastamosis and cecum with cold/hot snare polypectomies;  Surgeon: Luna Becerra MD;  Location: [facility] ENDOSCOPY;  Service: Gastroenterology;  Laterality: N/A;  Pre: h/x of colon polyps  Post: diverticulosis, polyps, left sided anastamosis, hemorrhoids    HYSTERECTOMY      SKIN TAG REMOVAL      TOTAL HIP ARTHROPLASTY Left 2023    Procedure: LEFT TOTAL HIP ARTHROPLASTY ANTERIOR;  Surgeon: Israel Carcamo II, MD;  Location:  [facility];  Service: Orthopedics;  Laterality: Left;         FAMILY HISTORY  Family History   Problem Relation Age of Onset    Diabetes Mother     Hypertension Mother     Kidney disease Mother     Heart disease Father     Arthritis Father     Coarctation of the aorta Father     Heart valve disorder Brother     Colon polyps Daughter     Seizures Son     Colon cancer Maternal Grandmother     Colon polyps Maternal Grandmother     Malig Hyperthermia Neg Hx          SOCIAL HISTORY  Social History     Socioeconomic History    Marital status: Single   Tobacco Use    Smoking status: Former     Current packs/day: 0.00     Average packs/day: 1 pack/day for 45.0 years (45.0 ttl pk-yrs)     Types: Cigarettes     Start date: 1970     Quit date: 2015     Years since quittin.6    Smokeless tobacco: Former     Quit date: 3/2/2016    Tobacco comments:     QUIT SMOKING DAILY IN  (1 PPD X 40  YEARS) , SMOKES OCCASIONALLY NOW    Vaping Use    Vaping status: Never Used   Substance and Sexual Activity    Alcohol use: Not Currently     Alcohol/week: 1.0 standard drink of alcohol     Comment: 1 GLASS WINE/ NIGHT    Drug use: No    Sexual activity: Not Currently     Partners: Male     Birth control/protection: Condom, Abstinence         ALLERGIES  Latex, Neosporin [neomycin-bacitracin zn-polymyx], Sulfa antibiotics, Atorvastatin, Meclizine, Aspartame, Celecoxib, Ciprofloxacin, Codeine, Fluticasone-salmeterol, Gabapentin, Pregabalin, Tetracyclines & related, Bupropion, Clarithromycin, Estrace [estradiol], Keflex [cephalexin], Tramadol, and Trazodone    REVIEW OF SYSTEMS  Review of Systems  Included in HPI  All systems reviewed and negative except for those discussed in HPI.      PHYSICAL EXAM  ED Triage Vitals [08/25/24 0917]   Temp Heart Rate Resp BP SpO2   97.5 °F (36.4 °C) 94 16 -- 95 %      Temp src Heart Rate Source Patient Position BP Location FiO2 (%)   -- -- -- -- --       Physical Exam      GENERAL: Awake, alert, oriented x 3.  Well-developed, well-nourished and nontoxic-appearing elderly female.  She appears mildly anxious.    HENT: NCAT, nares patent, oropharynx is benign  EYES: no scleral icterus  CV: regular rhythm, normal rate  RESPIRATORY: normal effort, clear to auscultation bilaterally  ABDOMEN: soft, nondistended, nontender  MUSCULOSKELETAL: Extremities are nontender with full range of motion.  No calf tenderness.  No pedal edema  NEURO: Speech is normal.  No facial droop.  PSYCH:  calm, cooperative  SKIN: warm, dry    Vital signs and nursing notes reviewed.          LAB RESULTS  Recent Results (from the past 24 hour(s))   ECG 12 Lead Dyspnea    Collection Time: 08/25/24  9:29 AM   Result Value Ref Range    QT Interval 356 ms    QTC Interval 421 ms   Respiratory Panel PCR w/COVID-19(SARS-CoV-2) [facility]-House, NP Swab in UTM/VTM, 2 HR TAT - Swab, Nasopharynx    Collection  Time: 08/25/24  9:32 AM    Specimen: Nasopharynx; Swab   Result Value Ref Range    ADENOVIRUS, PCR Not Detected Not Detected    Coronavirus 229E Not Detected Not Detected    Coronavirus HKU1 Not Detected Not Detected    Coronavirus NL63 Not Detected Not Detected    Coronavirus OC43 Not Detected Not Detected    COVID19 Not Detected Not Detected - Ref. Range    Human Metapneumovirus Not Detected Not Detected    Human Rhinovirus/Enterovirus Not Detected Not Detected    Influenza A PCR Not Detected Not Detected    Influenza B PCR Not Detected Not Detected    Parainfluenza Virus 1 Not Detected Not Detected    Parainfluenza Virus 2 Not Detected Not Detected    Parainfluenza Virus 3 Not Detected Not Detected    Parainfluenza Virus 4 Not Detected Not Detected    RSV, PCR Not Detected Not Detected    Bordetella pertussis pcr Not Detected Not Detected    Bordetella parapertussis PCR Not Detected Not Detected    Chlamydophila pneumoniae PCR Not Detected Not Detected    Mycoplasma pneumo by PCR Not Detected Not Detected   Comprehensive Metabolic Panel    Collection Time: 08/25/24  9:42 AM    Specimen: Blood   Result Value Ref Range    Glucose 98 65 - 99 mg/dL    BUN 21 8 - 23 mg/dL    Creatinine 0.99 0.57 - 1.00 mg/dL    Sodium 131 (L) 136 - 145 mmol/L    Potassium 4.1 3.5 - 5.2 mmol/L    Chloride 99 98 - 107 mmol/L    CO2 18.2 (L) 22.0 - 29.0 mmol/L    Calcium 9.1 8.6 - 10.5 mg/dL    Total Protein 6.6 6.0 - 8.5 g/dL    Albumin 3.9 3.5 - 5.2 g/dL    ALT (SGPT) 105 (H) 1 - 33 U/L    AST (SGOT) 190 (H) 1 - 32 U/L    Alkaline Phosphatase 209 (H) 39 - 117 U/L    Total Bilirubin 0.4 0.0 - 1.2 mg/dL    Globulin 2.7 gm/dL    A/G Ratio 1.4 g/dL    BUN/Creatinine Ratio 21.2 7.0 - 25.0    Anion Gap 13.8 5.0 - 15.0 mmol/L    eGFR 59.2 (L) >60.0 mL/min/1.73   BNP    Collection Time: 08/25/24  9:42 AM    Specimen: Blood   Result Value Ref Range    proBNP 1,025.0 0.0 - 1,800.0 pg/mL   Single High Sensitivity Troponin T    Collection Time:  08/25/24  9:42 AM    Specimen: Blood   Result Value Ref Range    HS Troponin T 22 (H) <14 ng/L   CBC Auto Differential    Collection Time: 08/25/24  9:42 AM    Specimen: Blood   Result Value Ref Range    WBC 14.42 (H) 3.40 - 10.80 10*3/mm3    RBC 4.75 3.77 - 5.28 10*6/mm3    Hemoglobin 14.5 12.0 - 15.9 g/dL    Hematocrit 43.8 34.0 - 46.6 %    MCV 92.2 79.0 - 97.0 fL    MCH 30.5 26.6 - 33.0 pg    MCHC 33.1 31.5 - 35.7 g/dL    RDW 12.0 (L) 12.3 - 15.4 %    RDW-SD 40.9 37.0 - 54.0 fl    MPV 12.1 (H) 6.0 - 12.0 fL    Platelets 122 (L) 140 - 450 10*3/mm3    Neutrophil % 73.0 42.7 - 76.0 %    Lymphocyte % 11.5 (L) 19.6 - 45.3 %    Monocyte % 14.6 (H) 5.0 - 12.0 %    Eosinophil % 0.0 (L) 0.3 - 6.2 %    Basophil % 0.3 0.0 - 1.5 %    Immature Grans % 0.6 (H) 0.0 - 0.5 %    Neutrophils, Absolute 10.52 (H) 1.70 - 7.00 10*3/mm3    Lymphocytes, Absolute 1.66 0.70 - 3.10 10*3/mm3    Monocytes, Absolute 2.11 (H) 0.10 - 0.90 10*3/mm3    Eosinophils, Absolute 0.00 0.00 - 0.40 10*3/mm3    Basophils, Absolute 0.04 0.00 - 0.20 10*3/mm3    Immature Grans, Absolute 0.09 (H) 0.00 - 0.05 10*3/mm3    nRBC 0.0 0.0 - 0.2 /100 WBC       Ordered the above labs and reviewed the results.        RADIOLOGY  XR Chest AP    Result Date: 8/25/2024  XR CHEST AP-  INDICATIONS: Cough and fever, COVID evaluation.  TECHNIQUE: FRONTAL VIEW OF THE CHEST  COMPARISON: 3/6/2023   FINDINGS:  The heart size is normal. Aorta is calcified. Pulmonary vasculature is unremarkable. Patchy infiltrate at the right lower lung may represent developing pneumonia, follow-up recommended. No pleural effusion or pneumothorax. No acute appearing osseous process. Densities projecting over the proximal humeral bones could be overlying soft tissue calcifications or could be sclerotic lesions of bone further evaluation can be obtained as may be indicated (for example, shoulder x-rays could characterize whether these densities are in the bone or soft tissue, and if in the bone, and  the patient has an underlying malignancy that results in sclerotic metastatic disease, bone scan correlation could be obtained).       As described.    This report was finalized on 8/25/2024 10:03 AM by Dr. Manan Lee M.D on [facility]station[facility]       Ordered the above noted radiological studies. Reviewed by me in PACS.            PROCEDURES  Procedures      OUTPATIENT MEDICATION MANAGEMENT:  No current Epic-ordered facility-administered medications on file.     Current Outpatient Medications Ordered in Epic   Medication Sig Dispense Refill    valsartan (DIOVAN) 80 MG tablet Take 2 tablets by mouth Every Morning.  3    acetaminophen (TYLENOL) 500 MG tablet Take 1 tablet by mouth Every 6 (Six) Hours As Needed for Mild Pain.      ALPRAZolam (XANAX) 1 MG tablet Take 0.5 tablets by mouth Every Night.  4    coenzyme Q10 100 MG capsule Take 2 capsules by mouth Every Evening.      Eyelid Cleansers (Avenova) 0.01 % solution Administer  to both eyes Every Night.      guaifenesin-dextromethorphan 600-30 mg (MUCINEX DM)  MG tablet sustained-release 12 hour Take 1 tablet by mouth 2 (Two) Times a Day As Needed (Cough). 20 tablet 0    metoprolol succinate XL (TOPROL-XL) 25 MG 24 hr tablet Take 1 tablet by mouth Every Evening.      montelukast (SINGULAIR) 10 MG tablet Take 1 tablet by mouth Every Evening.  3    polyethylene glycol (MIRALAX) 17 g packet Take 17 g by mouth Every Night.      pravastatin (PRAVACHOL) 20 MG tablet Take 1 tablet by mouth Every Night.      Probiotic Product (PROBIOTIC DAILY PO) Take 1 tablet by mouth As Needed. (Patient not taking: Reported on 5/8/2024)      simethicone (MYLICON) 125 MG chewable tablet Chew 1 tablet Every 6 (Six) Hours As Needed for Flatulence. (Patient not taking: Reported on 5/8/2024)      triamcinolone (KENALOG) 0.1 % ointment Apply  topically to the appropriate area as directed 2 (Two) Times a Day.      valACYclovir (VALTREX) 1000 MG tablet Take 1 tablet by mouth As  Needed (FEVER BLISTERS). (Patient not taking: Reported on 5/8/2024)      vitamin D (ERGOCALCIFEROL) 64429 UNITS capsule capsule 1 capsule Every 7 (Seven) Days. SATURDAYS  3           MEDICATIONS GIVEN IN ER  Medications - No data to display                MEDICAL DECISION MAKING, PROGRESS, and CONSULTS    All labs have been independently reviewed by me.  All radiology studies have been reviewed by me and I have also reviewed the radiology report.   EKG's independently viewed and interpreted by me.  Discussion below represents my analysis of pertinent findings related to patient's condition, differential diagnosis, treatment plan and final disposition.      Additional sources:    - Discussed/ obtained information from independent historians: None    - External (non-ED) record review: Patient saw her PCP on 8/21/2024 for cough, congestion, and wheezing.  She was given prescriptions for prednisone and albuterol inhaler.  She had a chest x-ray which showed right middle lobe atelectasis.  COVID and flu test were negative.  Patient was last admitted here in March 2023 for a left hip replacement    -Prescription drug monitoring program review:     N/A    - Chronic or social conditions impacting patient care (Social Determinants of Health): None          Orders placed during this visit:  Orders Placed This Encounter   Procedures    Respiratory Panel PCR w/COVID-19(SARS-CoV-2) [facility] In-House, NP Swab in UTM/VTM, 2 HR TAT - Swab, Nasopharynx    XR Chest AP    Comprehensive Metabolic Panel    BNP    Single High Sensitivity Troponin T    CBC Auto Differential    Monitor Blood Pressure    Continuous Pulse Oximetry    ECG 12 Lead Dyspnea    CBC & Differential         Additional orders considered but not ordered:          Differential diagnosis includes, but is not limited to:    Differential diagnosis includes but is not limited to CHF, acute coronary syndrome, pulmonary embolism, pneumothorax, pneumonia,  asthma/COPD, deconditioning, anemia, anxiety.         Independent interpretation of labs, radiology studies, and discussions with consultants:  ED Course as of 08/25/24 1415   Sun Aug 25, 2024   0919 Temp: 97.5 °F (36.4 °C) [WH]   0919 Heart Rate: 94 [WH]   0919 Resp: 16 [WH]   0919 SpO2: 95 % [WH]   0919 Device (Oxygen Therapy): room air [WH]   0929 Patient presents to the ED complaining of nonproductive cough and shortness of breath.  She is not hypoxic, tachypneic, or febrile.  On exam, lungs are clear.  Will obtain labs, EKG, and chest x-ray for further evaluation.  Differential diagnosis includes but is not limited to: URI, bronchitis, pneumonia, CHF, asthma [WH]   1033 EKG personally interpreted by me at 9:34 AM.  My personal interpretation is:          EKG time: 9:29 AM  Rhythm/Rate: Sinus rhythm, rate 84  P waves and MD: LAE  QRS, axis: LAD, late transition  ST and T waves: Normal    Interpreted Contemporaneously by me, independently viewed  EKG is not significantly changed compared to prior EKG done on 3/7/2016   [WH]   1041 HS Troponin T(!): 22 [WH]   1041 Glucose: 98 [WH]   1041 BUN: 21 [WH]   1041 Creatinine: 0.99 [WH]   1041 Sodium(!): 131 [WH]   1041 CO2(!): 18.2 [WH]   1041 WBC(!): 14.42  Patient has been on prednisone [WH]   1041 ALT (SGPT)(!): 105 [WH]   1041 AST (SGOT)(!): 190 [WH]   1041 Alkaline Phosphatase(!): 209  LFTs were normal on 5/13/2024 [WH]   1041 Total Bilirubin: 0.4 [WH]   1041 Respiratory panel is negative [WH]   1042 Chest x-ray personally interpreted by me.  My personal interpretation is: Heart size is normal.  There is a linear opacity in the right lung base.  No pleural effusion.    Per the radiologist, there is a patchy infiltrate at the right lower lung which may represent developing pneumonia.  No pleural effusion or pneumothorax. [WH]   1159 Patient is resting and breathing comfortably.  Oxygen saturation is 96% on room air.  Test results and diagnoses were discussed with  her.  Patient was advised to continue taking amoxicillin and using her inhaler.  I will give her a prescription for cough medication.  She was instructed to have her liver enzymes rechecked in the next few weeks.  She has had a cholecystectomy.  Abdominal exam remains benign.  Return precautions were discussed. [WH]   1220 MDM: Patient presented to the ED complaining of cough and shortness of breath.  She recently saw her PCP and was started on steroids, inhaler, and amoxicillin.  In the ED, patient was afebrile.  She was not hypoxic, tachycardic, or tachypneic.  Chest x-ray showed possible infiltrate in the right lung base.  White blood cell count was mildly elevated.  Respiratory panel was negative.  Her LFTs were elevated but she did not have any abdominal pain.  She has had a prior cholecystectomy.  Suspect the patient has pneumonia.  She is already on antibiotics. [WH]      ED Course User Index  [WH] Todd Gandhi MD         COMPLEXITY OF CARE  Admission was considered but after careful review of the patient's presentation, physical examination, diagnostic results, and response to treatment the patient may be safely discharged with outpatient follow-up.      DIAGNOSIS  Final diagnoses:   Pneumonia of right lower lobe due to infectious organism   Hyponatremia   Elevated LFTs         DISPOSITION  DISCHARGE    Patient discharged in stable condition.    Reviewed implications of results, diagnosis, meds, responsibility to follow up, warning signs and symptoms of possible worsening, potential complications and reasons to return to ER, including worsening/persistent symptoms, fever, chest pain, or other concern..    Patient/Family voiced understanding of above instructions.    Discussed plan for discharge, as there is no emergent indication for admission. Patient referred to primary care provider for BP management due to today's BP. Pt/family is agreeable and understands need for follow up and repeat testing.   Pt is aware that discharge does not mean that nothing is wrong but it indicates no emergency is present that requires admission and they must continue care with follow-up as given below or physician of their choice.     FOLLOW-UP  Olivia Peres MD  [address]  [address]  [place] [id number] [phone]    Schedule an appointment as soon as possible for a visit            Medication List        New Prescriptions      guaifenesin-dextromethorphan 600-30 mg  MG tablet sustained-release 12 hour  Take 1 tablet by mouth 2 (Two) Times a Day As Needed (Cough).               Where to Get Your Medications        These medications were sent to [facility] [id number] - [facility] [address] [facility] - [phone] [facility] [phone]   [address] [place] [address], [place] [id number]      Phone: [phone]   guaifenesin-dextromethorphan 600-30 mg  MG tablet sustained-release 12 hour                   Latest Documented Vital Signs:  AS OF 14:15 EDT VITALS:    BP - 127/72  HR - 88  TEMP - 97.5 °F (36.4 °C)  O2 SATS - 95%            --    Please note that portions of this were completed with a voice recognition program.       Note Disclaimer: At [facility], we believe that sharing information builds trust and better relationships. You are receiving this note because you are receiving care at [facility] or recently visited. It is possible you will see health information before a provider has talked with you about it. This kind of information can be easy to misunderstand. To help you fully understand what it means for your health, we urge you to discuss this note with your provider.             Todd Gandhi MD  08/25/24 5648

## 2024-08-27 LAB
QT INTERVAL: 356 MS
QTC INTERVAL: 421 MS

## 2024-09-11 ENCOUNTER — TELEPHONE (OUTPATIENT)
Dept: GASTROENTEROLOGY | Facility: CLINIC | Age: 79
End: 2024-09-11

## 2024-09-11 ENCOUNTER — OFFICE VISIT (OUTPATIENT)
Dept: GASTROENTEROLOGY | Facility: CLINIC | Age: 79
End: 2024-09-11
Payer: MEDICARE

## 2024-09-11 ENCOUNTER — PREP FOR SURGERY (OUTPATIENT)
Dept: OTHER | Facility: HOSPITAL | Age: 79
End: 2024-09-11
Payer: MEDICARE

## 2024-09-11 VITALS
WEIGHT: 163.8 LBS | HEART RATE: 105 BPM | TEMPERATURE: 96.6 F | DIASTOLIC BLOOD PRESSURE: 78 MMHG | BODY MASS INDEX: 25.71 KG/M2 | SYSTOLIC BLOOD PRESSURE: 112 MMHG | HEIGHT: 67 IN

## 2024-09-11 DIAGNOSIS — R68.81 EARLY SATIETY: Primary | ICD-10-CM

## 2024-09-11 DIAGNOSIS — R93.2 ABNORMAL LIVER ULTRASOUND: ICD-10-CM

## 2024-09-11 DIAGNOSIS — R59.1 LYMPHADENOPATHY: ICD-10-CM

## 2024-09-11 DIAGNOSIS — R59.0 LYMPHADENOPATHY, ABDOMINAL: ICD-10-CM

## 2024-09-11 DIAGNOSIS — R79.89 ELEVATED LFTS: ICD-10-CM

## 2024-09-11 DIAGNOSIS — D69.6 THROMBOCYTOPENIA: ICD-10-CM

## 2024-09-11 PROCEDURE — 1160F RVW MEDS BY RX/DR IN RCRD: CPT | Performed by: INTERNAL MEDICINE

## 2024-09-11 PROCEDURE — 1159F MED LIST DOCD IN RCRD: CPT | Performed by: INTERNAL MEDICINE

## 2024-09-11 PROCEDURE — 99214 OFFICE O/P EST MOD 30 MIN: CPT | Performed by: INTERNAL MEDICINE

## 2024-09-11 RX ORDER — ALBUTEROL SULFATE 1.25 MG/3ML
1.25 SOLUTION RESPIRATORY (INHALATION)
COMMUNITY
Start: 2024-09-05 | End: 2024-10-05

## 2024-09-11 NOTE — PROGRESS NOTES
Subjective   Chief Complaint   Patient presents with    Difficulty Swallowing    Nausea    elevated lft       Debo Blair is a  79 y.o. female here for a follow up visit for dysphagia, nausea, elevated LFT.     She reports that her liver labs were noted to be elevated on a PCP follow-up from her recent pneumonia.  She has noted some discomfort in the right upper quadrant.  She noted a nodule in her upper abdomen that has been present in just a few weeks to months.  She reports early satiety.  No difficulty swallowing.  She just not as hungry.  She does not think she is lost any weight but she feels like her abdomen is somewhat distended.  She is very fatigued.  She complains of bone pain.  No blood in the stool.    Labs from this month were reviewed.  She was noted to have elevated labs on routine testing 2 weeks ago.  Repeat labs 1 week ago shows worsening transaminitis with an increase in alk phos.  Total bilirubin is normal.  She had an ultrasound yesterday that shows innumerable masses throughout the liver suspicious for metastatic disease.  Recommend CT of the abdomen and pelvis with IV contrast to further evaluate.    Colonoscopy June 2021-tubular adenoma x 2-5-year recall.  She has a history of prior hemicolectomy secondary to an endoscopically unresectable polyp in 2016.  HPI  Past Medical History:   Diagnosis Date    Anxiety     Chest pain syndrome     FOLLOWED BY DR [facility]    Colon polyp     Coronary artery disease 08/09/2022    Diverticulitis of colon     Diverticulosis     Fatty liver     Fibrocystic breast changes 1971    GERD (gastroesophageal reflux disease)     History of colon polyps     Hyperlipidemia     Hypertension     IBS (irritable bowel syndrome)     Lactose intolerance     Lichen sclerosus     Melanosis coli     Nocturia     Osteoarthritis     Osteoporosis     Palpitations     Paralytic ileus 1975    PONV (postoperative nausea and vomiting)      Past Surgical  History:   Procedure Laterality Date    ABDOMINAL SURGERY  2017    ANAL FISSURECTOMY  2004    CHOLECYSTECTOMY      COLON RESECTION N/A 03/21/2016    Procedure: COLON RESECTION LAPAROSCOPIC LEFT MINDY COLECTOMY ;  Surgeon: Nikki Smith MD;  [facility] MA[facility];  Service:     COLONOSCOPY  01/18/2016    polyps, NBIH, tubular adenoma w/low grade dysplasia    COLONOSCOPY N/A 08/22/2018    Procedure: COLONOSCOPY TO CECUM AND INTO TERMINAL ILEUM WITH HOT SNARE POLYPECTOMY,  5 ML SALINE LIFT INJECTION, 1 ML TATTOO INK INJECTION, RESOLUTION CLIP X1 PLACEMENT, AND APC CAUTERY TO TRANSVERSE COLON POLYP SITE;  Surgeon: Luna Becerra MD;  Location: [facility] ENDOSCOPY;  Service: Gastroenterology    COLONOSCOPY N/A 06/02/2021    Procedure: COLONOSCOPY to anastamosis and cecum with cold/hot snare polypectomies;  Surgeon: Luna Becerra MD;  Location: [facility] ENDOSCOPY;  Service: Gastroenterology;  Laterality: N/A;  Pre: h/x of colon polyps  Post: diverticulosis, polyps, left sided anastamosis, hemorrhoids    HYSTERECTOMY      SKIN TAG REMOVAL  2004    TOTAL HIP ARTHROPLASTY Left 03/17/2023    Procedure: LEFT TOTAL HIP ARTHROPLASTY ANTERIOR;  Surgeon: Israel Carcamo II, MD;  Location: [facility] [place];  Service: Orthopedics;  Laterality: Left;       Current Outpatient Medications:     albuterol (ACCUNEB) 1.25 MG/3ML nebulizer solution, Inhale 3 mL., Disp: , Rfl:     ALPRAZolam (XANAX) 1 MG tablet, Take 0.5 tablets by mouth Every Night., Disp: , Rfl: 4    coenzyme Q10 100 MG capsule, Take 2 capsules by mouth Every Evening., Disp: , Rfl:     Eyelid Cleansers (Avenova) 0.01 % solution, Administer  to both eyes Every Night., Disp: , Rfl:     metoprolol succinate XL (TOPROL-XL) 25 MG 24 hr tablet, Take 1 tablet by mouth Every Evening., Disp: , Rfl:     montelukast (SINGULAIR) 10 MG tablet, Take 1 tablet by mouth Every Evening., Disp: , Rfl: 3    polyethylene glycol (MIRALAX) 17 g packet, Take 17 g by mouth Every  Night., Disp: , Rfl:     Probiotic Product (PROBIOTIC DAILY PO), Take 1 tablet by mouth As Needed., Disp: , Rfl:     simethicone (MYLICON) 125 MG chewable tablet, Chew 1 tablet Every 6 (Six) Hours As Needed for Flatulence., Disp: , Rfl:     triamcinolone (KENALOG) 0.1 % ointment, Apply  topically to the appropriate area as directed 2 (Two) Times a Day., Disp: , Rfl:     valACYclovir (VALTREX) 1000 MG tablet, Take 1 tablet by mouth As Needed (FEVER BLISTERS)., Disp: , Rfl:     valsartan (DIOVAN) 80 MG tablet, Take 2 tablets by mouth Every Morning., Disp: , Rfl: 3    vitamin D (ERGOCALCIFEROL) 49226 UNITS capsule capsule, 1 capsule Every 7 (Seven) Days. SATURDAYS, Disp: , Rfl: 3    acetaminophen (TYLENOL) 500 MG tablet, Take 1 tablet by mouth Every 6 (Six) Hours As Needed for Mild Pain. (Patient not taking: Reported on 9/11/2024), Disp: , Rfl:   PRN Meds:.  Allergies   Allergen Reactions    Latex Rash    Neosporin [Neomycin-Bacitracin Zn-Polymyx] Rash    Sulfa Antibiotics Nausea And Vomiting    Atorvastatin Other (See Comments)     Blurred vision    Escitalopram Nausea Only    Meclizine Other (See Comments)     LIP TINGLING     Aspartame Other (See Comments)     unknown    Celecoxib Other (See Comments)     GI upset    Ciprofloxacin Other (See Comments)     intolerant    Codeine Nausea And Vomiting    Fluticasone-Salmeterol Other (See Comments)     coughing    Gabapentin Swelling     ON LIPS    Pregabalin Other (See Comments)     CONSTIPATION    Tetracyclines & Related Unknown - Low Severity and Other (See Comments)     Abdominal pain    Bupropion Palpitations     Irritability, anx, dizzy    Clarithromycin Rash    Estrace [Estradiol] Rash    Keflex [Cephalexin] Itching and Rash    Tramadol Palpitations     Heart racing    Trazodone Palpitations     Social History     Socioeconomic History    Marital status: Single   Tobacco Use    Smoking status: Former     Current packs/day: 0.00     Average packs/day: 1 pack/day for  45.0 years (45.0 ttl pk-yrs)     Types: Cigarettes     Start date: 1970     Quit date: 2015     Years since quittin.7    Smokeless tobacco: Former     Quit date: 3/2/2016    Tobacco comments:     QUIT SMOKING DAILY IN  (1 PPD X 40 YEARS) , SMOKES OCCASIONALLY NOW    Vaping Use    Vaping status: Never Used   Substance and Sexual Activity    Alcohol use: Not Currently     Alcohol/week: 1.0 standard drink of alcohol     Comment: 1 GLASS WINE/ NIGHT    Drug use: No    Sexual activity: Not Currently     Partners: Male     Birth control/protection: Condom, Abstinence     Family History   Problem Relation Age of Onset    Diabetes Mother     Hypertension Mother     Kidney disease Mother     Heart disease Father     Arthritis Father     Coarctation of the aorta Father     Heart valve disorder Brother     Colon polyps Daughter     Seizures Son     Colon cancer Maternal Grandmother     Colon polyps Maternal Grandmother     Malig Hyperthermia Neg Hx      Review of Systems   Constitutional:  Positive for appetite change and fatigue. Negative for unexpected weight change.   HENT:  Negative for trouble swallowing.    Gastrointestinal:  Positive for abdominal distention.   Musculoskeletal:  Positive for arthralgias.     Vitals:    24 1530   BP: 112/78   Pulse: 105   Temp: 96.6 °F (35.9 °C)         24  1530   Weight: 74.3 kg (163 lb 12.8 oz)       Objective   Physical Exam  Constitutional:       Appearance: Normal appearance. She is well-developed.   HENT:      Head: Normocephalic and atraumatic.   Eyes:      General: No scleral icterus.     Conjunctiva/sclera: Conjunctivae normal.   Pulmonary:      Effort: Pulmonary effort is normal.      Breath sounds: Normal breath sounds.   Abdominal:      General: There is no distension.      Palpations: Abdomen is soft.      Tenderness: There is no abdominal tenderness.      Comments: Palpable lymph node in the mid abdomen to the left of the midline  "  Musculoskeletal:      Cervical back: Normal range of motion and neck supple.   Skin:     General: Skin is warm and dry.   Neurological:      Mental Status: She is alert.   Psychiatric:         Mood and Affect: Mood normal.         Behavior: Behavior normal.       Lab Results   Component Value Date    WBC 11.84 (H) 09/04/2024    HGB 15.2 09/04/2024    HCT 45.3 09/04/2024    MCV 91.7 09/04/2024     (L) 09/04/2024     Lab Results   Component Value Date    GLUCOSE 98 08/25/2024    BUN 21 08/25/2024    CREATININE 0.99 08/25/2024    EGFR 59.2 (L) 08/25/2024    BCR 21.2 08/25/2024    K 4.1 08/25/2024    CO2 18.2 (L) 08/25/2024    CALCIUM 9.1 08/25/2024    ALBUMIN 3.8 09/04/2024    BILITOT 0.6 09/04/2024     (H) 09/04/2024     (H) 09/04/2024     No results found for: \"PTTV386\"   No results found for: \"CALPROFECAL\"     XR Chest AP    Result Date: 8/25/2024   As described.    This report was finalized on 8/25/2024 10:03 AM by Dr. Manan Lee M.D on Workstation: [employer]        Assessment & Plan   Diagnoses and all orders for this visit:    Early satiety    Lymphadenopathy    Abnormal liver ultrasound    Elevated LFTs    Thrombocytopenia    Other orders  -     albuterol (ACCUNEB) 1.25 MG/3ML nebulizer solution; Inhale 3 mL.      Plan:  Her presentation is concerning in light of her imaging and laboratory findings.  She has a CT scan scheduled on 9/19 for further evaluation of her liver.  I am concerned for hepatic metastatic disease.  Primary is uncertain but she does have some upper GI symptoms and palpable abdominal lymphadenopathy.  Will plan tentatively for an EGD on 9/25 depending on the results of her CT scan.  If these results warrant alternative testing we can make arrangements at that time.  She is very fatigued.  I feel like she is nutritionally at risk and not taking in enough calories which may be contributing to this.  We discussed adding nutritional supplementation and ways to " increase her caloric intake.  Will repeat her LFTs at the time of her EGD.  Also discussed with her that if her symptoms progress and she is feeling more poorly, she should go to the emergency room for immediate evaluation in which case her workup would be expedited but hopefully we can get things done as an outpatient in the next 1 to 2 weeks and formulate a plan

## 2024-09-17 ENCOUNTER — TELEPHONE (OUTPATIENT)
Dept: GASTROENTEROLOGY | Facility: CLINIC | Age: 79
End: 2024-09-17
Payer: MEDICARE

## (undated) DEVICE — RECIPROCATING BLADE HEAVY DUTY LONG, OFFSET  (77.6 X 0.77 X 11.2MM)

## (undated) DEVICE — SOL ISO/ALC 70PCT 4OZ

## (undated) DEVICE — SINGLE-USE POLYPECTOMY SNARE: Brand: CAPTIVATOR II

## (undated) DEVICE — ADAPT CLN BIOGUARD AIR/H2O DISP

## (undated) DEVICE — APC PROBE 2200 A, SINGLE USE OD 2.3MM/6.9FR; L. 2.2M/7.2FT: Brand: ERBE

## (undated) DEVICE — S/M FLEXIBLE ALEXIS ORTHOPAEDIC PROTECTOR: Brand: ALEXIS® ORTHOPAEDIC PROTECTOR

## (undated) DEVICE — 450 ML BOTTLE OF 0.05% CHLORHEXIDINE GLUCONATE IN 99.95% STERILE WATER FOR IRRIGATION, USP AND APPLICATOR.: Brand: IRRISEPT ANTIMICROBIAL WOUND LAVAGE

## (undated) DEVICE — THE CARR-LOCKE INJECTION NEEDLE IS A SINGLE USE, DISPOSABLE, FLEXIBLE SHEATH INJECTION NEEDLE USED FOR THE INJECTION OF VARIOUS TYPES OF MEDIA THROUGH FLEXIBLE ENDOSCOPES.

## (undated) DEVICE — PK ANT HIP 40

## (undated) DEVICE — GLV SURG SIGNATURE ESSENTIAL PF LTX SZ8.5

## (undated) DEVICE — LN SMPL CO2 SHTRM SD STREAM W/M LUER

## (undated) DEVICE — KT ORCA ORCAPOD DISP STRL

## (undated) DEVICE — ZIP 16 SURGICAL SKIN CLOSURE DEVICE, PSA: Brand: ZIP 16 SURGICAL SKIN CLOSURE DEVICE

## (undated) DEVICE — NEEDLE, QUINCKE, 20GX3.5": Brand: MEDLINE

## (undated) DEVICE — SINGLE-USE BIOPSY FORCEPS: Brand: RADIAL JAW 4

## (undated) DEVICE — TUBING, SUCTION, 1/4" X 10', STRAIGHT: Brand: MEDLINE

## (undated) DEVICE — SENSR O2 OXIMAX FNGR A/ 18IN NONSTR

## (undated) DEVICE — THE TORRENT IRRIGATION SCOPE CONNECTOR IS USED WITH THE TORRENT IRRIGATION TUBING TO PROVIDE IRRIGATION FLUIDS SUCH AS STERILE WATER DURING GASTROINTESTINAL ENDOSCOPIC PROCEDURES WHEN USED IN CONJUNCTION WITH AN IRRIGATION PUMP (OR ELECTROSURGICAL UNIT).: Brand: TORRENT

## (undated) DEVICE — TBG PENCL TELESCP MEGADYNE SMOKE EVAC 10FT

## (undated) DEVICE — THE SINGLE USE ETRAP – POLYP TRAP IS USED FOR SUCTION RETRIEVAL OF ENDOSCOPICALLY REMOVED POLYPS.: Brand: ETRAP

## (undated) DEVICE — SOL NACL 0.9PCT 100ML SGL

## (undated) DEVICE — SNAR POLYP SENSATION STDOVL 27 240 BX40

## (undated) DEVICE — SNAR POLYP CAPTIVATOR HEX 27MM 240CM

## (undated) DEVICE — Device: Brand: DEFENDO AIR/WATER/SUCTION AND BIOPSY VALVE

## (undated) DEVICE — MAT FLR ABSORBENT LG 4FT 10 2.5FT

## (undated) DEVICE — Device: Brand: SPOT EX ENDOSCOPIC TATTOO

## (undated) DEVICE — CANN NASL CO2 TRULINK W/O2 A/

## (undated) DEVICE — SUT ETHIB 2 CV V37 MS/4 30IN MX69G

## (undated) DEVICE — TRAP FLD MINIVAC MEGADYNE 100ML

## (undated) DEVICE — SNAR POLYP CAPTIVATOR CRSNT 27MM 240CM

## (undated) DEVICE — CANN O2 ETCO2 FITS ALL CONN CO2 SMPL A/ 7IN DISP LF

## (undated) DEVICE — 3M™ IOBAN™ 2 ANTIMICROBIAL INCISE DRAPE 6640EZ: Brand: IOBAN™ 2

## (undated) DEVICE — GLV SURG PREMIERPRO ORTHO LTX PF SZ8.5 BRN

## (undated) DEVICE — APPL CHLORAPREP HI/LITE 26ML ORNG